# Patient Record
Sex: FEMALE | Race: WHITE | NOT HISPANIC OR LATINO | Employment: OTHER | URBAN - METROPOLITAN AREA
[De-identification: names, ages, dates, MRNs, and addresses within clinical notes are randomized per-mention and may not be internally consistent; named-entity substitution may affect disease eponyms.]

---

## 2018-12-27 ENCOUNTER — RECORDS - HEALTHEAST (OUTPATIENT)
Dept: LAB | Facility: CLINIC | Age: 68
End: 2018-12-27

## 2018-12-27 LAB
ALBUMIN SERPL-MCNC: 4.1 G/DL (ref 3.5–5)
ALP SERPL-CCNC: 67 U/L (ref 45–120)
ALT SERPL W P-5'-P-CCNC: 12 U/L (ref 0–45)
ANION GAP SERPL CALCULATED.3IONS-SCNC: 11 MMOL/L (ref 5–18)
AST SERPL W P-5'-P-CCNC: 16 U/L (ref 0–40)
BILIRUB SERPL-MCNC: 0.6 MG/DL (ref 0–1)
BUN SERPL-MCNC: 10 MG/DL (ref 8–28)
CALCIUM SERPL-MCNC: 10 MG/DL (ref 8.5–10.5)
CHLORIDE BLD-SCNC: 98 MMOL/L (ref 98–107)
CHOLEST SERPL-MCNC: 221 MG/DL
CO2 SERPL-SCNC: 25 MMOL/L (ref 22–31)
CREAT SERPL-MCNC: 0.7 MG/DL (ref 0.6–1.1)
FASTING STATUS PATIENT QL REPORTED: NO
GFR SERPL CREATININE-BSD FRML MDRD: >60 ML/MIN/1.73M2
GLUCOSE BLD-MCNC: 112 MG/DL (ref 70–125)
HDLC SERPL-MCNC: 86 MG/DL
LDLC SERPL CALC-MCNC: 121 MG/DL
POTASSIUM BLD-SCNC: 4.1 MMOL/L (ref 3.5–5)
PROT SERPL-MCNC: 6.6 G/DL (ref 6–8)
SODIUM SERPL-SCNC: 134 MMOL/L (ref 136–145)
TRIGL SERPL-MCNC: 69 MG/DL

## 2020-03-17 ENCOUNTER — RECORDS - HEALTHEAST (OUTPATIENT)
Dept: LAB | Facility: CLINIC | Age: 70
End: 2020-03-17

## 2020-03-19 LAB — BACTERIA SPEC CULT: ABNORMAL

## 2021-03-12 ENCOUNTER — RECORDS - HEALTHEAST (OUTPATIENT)
Dept: LAB | Facility: CLINIC | Age: 71
End: 2021-03-12

## 2021-03-12 LAB
ALBUMIN SERPL-MCNC: 4 G/DL (ref 3.5–5)
ALP SERPL-CCNC: 63 U/L (ref 45–120)
ALT SERPL W P-5'-P-CCNC: 10 U/L (ref 0–45)
ANION GAP SERPL CALCULATED.3IONS-SCNC: 12 MMOL/L (ref 5–18)
AST SERPL W P-5'-P-CCNC: 15 U/L (ref 0–40)
BILIRUB SERPL-MCNC: 0.9 MG/DL (ref 0–1)
BUN SERPL-MCNC: 15 MG/DL (ref 8–28)
CALCIUM SERPL-MCNC: 9.2 MG/DL (ref 8.5–10.5)
CHLORIDE BLD-SCNC: 96 MMOL/L (ref 98–107)
CO2 SERPL-SCNC: 25 MMOL/L (ref 22–31)
CREAT SERPL-MCNC: 0.74 MG/DL (ref 0.6–1.1)
GFR SERPL CREATININE-BSD FRML MDRD: >60 ML/MIN/1.73M2
GLUCOSE BLD-MCNC: 99 MG/DL (ref 70–125)
POTASSIUM BLD-SCNC: 4.1 MMOL/L (ref 3.5–5)
PROT SERPL-MCNC: 6.4 G/DL (ref 6–8)
SODIUM SERPL-SCNC: 133 MMOL/L (ref 136–145)

## 2021-03-14 LAB — BACTERIA SPEC CULT: ABNORMAL

## 2021-06-01 ENCOUNTER — RECORDS - HEALTHEAST (OUTPATIENT)
Dept: ADMINISTRATIVE | Facility: CLINIC | Age: 71
End: 2021-06-01

## 2021-06-09 ENCOUNTER — RECORDS - HEALTHEAST (OUTPATIENT)
Dept: ADMINISTRATIVE | Facility: CLINIC | Age: 71
End: 2021-06-09

## 2021-07-13 ENCOUNTER — RECORDS - HEALTHEAST (OUTPATIENT)
Dept: ADMINISTRATIVE | Facility: CLINIC | Age: 71
End: 2021-07-13

## 2021-07-16 ENCOUNTER — LAB REQUISITION (OUTPATIENT)
Dept: LAB | Facility: CLINIC | Age: 71
End: 2021-07-16
Payer: COMMERCIAL

## 2021-07-16 DIAGNOSIS — R60.0 LOCALIZED EDEMA: ICD-10-CM

## 2021-07-16 LAB
ALBUMIN SERPL-MCNC: 4.2 G/DL (ref 3.5–5)
ALP SERPL-CCNC: 83 U/L (ref 45–120)
ALT SERPL W P-5'-P-CCNC: 9 U/L (ref 0–45)
ANION GAP SERPL CALCULATED.3IONS-SCNC: 14 MMOL/L (ref 5–18)
AST SERPL W P-5'-P-CCNC: 16 U/L (ref 0–40)
BILIRUB SERPL-MCNC: 0.9 MG/DL (ref 0–1)
BUN SERPL-MCNC: 14 MG/DL (ref 8–28)
CALCIUM SERPL-MCNC: 10 MG/DL (ref 8.5–10.5)
CHLORIDE BLD-SCNC: 96 MMOL/L (ref 98–107)
CO2 SERPL-SCNC: 21 MMOL/L (ref 22–31)
CREAT SERPL-MCNC: 0.73 MG/DL (ref 0.6–1.1)
GFR SERPL CREATININE-BSD FRML MDRD: 82 ML/MIN/1.73M2
GLUCOSE BLD-MCNC: 112 MG/DL (ref 70–125)
POTASSIUM BLD-SCNC: 4.5 MMOL/L (ref 3.5–5)
PROT SERPL-MCNC: 6.7 G/DL (ref 6–8)
SODIUM SERPL-SCNC: 131 MMOL/L (ref 136–145)

## 2021-07-16 PROCEDURE — 82040 ASSAY OF SERUM ALBUMIN: CPT | Performed by: FAMILY MEDICINE

## 2021-07-16 PROCEDURE — 82247 BILIRUBIN TOTAL: CPT | Performed by: FAMILY MEDICINE

## 2021-07-21 ENCOUNTER — RECORDS - HEALTHEAST (OUTPATIENT)
Dept: ADMINISTRATIVE | Facility: CLINIC | Age: 71
End: 2021-07-21

## 2022-07-20 ENCOUNTER — LAB REQUISITION (OUTPATIENT)
Dept: LAB | Facility: CLINIC | Age: 72
End: 2022-07-20
Payer: COMMERCIAL

## 2022-07-20 DIAGNOSIS — R41.3 OTHER AMNESIA: ICD-10-CM

## 2022-07-20 DIAGNOSIS — I10 ESSENTIAL (PRIMARY) HYPERTENSION: ICD-10-CM

## 2022-07-20 DIAGNOSIS — Z13.220 ENCOUNTER FOR SCREENING FOR LIPOID DISORDERS: ICD-10-CM

## 2022-07-20 LAB
ALBUMIN SERPL BCG-MCNC: 4.6 G/DL (ref 3.5–5.2)
ALP SERPL-CCNC: 52 U/L (ref 35–104)
ALT SERPL W P-5'-P-CCNC: 8 U/L (ref 10–35)
ANION GAP SERPL CALCULATED.3IONS-SCNC: 12 MMOL/L (ref 7–15)
AST SERPL W P-5'-P-CCNC: 17 U/L (ref 10–35)
BILIRUB SERPL-MCNC: 0.7 MG/DL
BUN SERPL-MCNC: 16.6 MG/DL (ref 8–23)
CALCIUM SERPL-MCNC: 10.1 MG/DL (ref 8.8–10.2)
CHLORIDE SERPL-SCNC: 100 MMOL/L (ref 98–107)
CHOLEST SERPL-MCNC: 171 MG/DL
CREAT SERPL-MCNC: 0.82 MG/DL (ref 0.51–0.95)
CRP SERPL-MCNC: <3 MG/L
DEPRECATED HCO3 PLAS-SCNC: 26 MMOL/L (ref 22–29)
ERYTHROCYTE [SEDIMENTATION RATE] IN BLOOD BY WESTERGREN METHOD: 9 MM/HR (ref 0–30)
GFR SERPL CREATININE-BSD FRML MDRD: 75 ML/MIN/1.73M2
GLUCOSE SERPL-MCNC: 117 MG/DL (ref 70–99)
HDLC SERPL-MCNC: 75 MG/DL
LDLC SERPL CALC-MCNC: 85 MG/DL
NONHDLC SERPL-MCNC: 96 MG/DL
POTASSIUM SERPL-SCNC: 3.8 MMOL/L (ref 3.4–5.3)
PROT SERPL-MCNC: 6.8 G/DL (ref 6.4–8.3)
SODIUM SERPL-SCNC: 138 MMOL/L (ref 136–145)
TRIGL SERPL-MCNC: 56 MG/DL
TSH SERPL DL<=0.005 MIU/L-ACNC: 3.69 UIU/ML (ref 0.3–4.2)
VIT B12 SERPL-MCNC: 313 PG/ML (ref 232–1245)

## 2022-07-20 PROCEDURE — 86140 C-REACTIVE PROTEIN: CPT | Mod: ORL | Performed by: PHYSICIAN ASSISTANT

## 2022-07-20 PROCEDURE — 80061 LIPID PANEL: CPT | Mod: ORL | Performed by: PHYSICIAN ASSISTANT

## 2022-07-20 PROCEDURE — 82607 VITAMIN B-12: CPT | Mod: ORL | Performed by: PHYSICIAN ASSISTANT

## 2022-07-20 PROCEDURE — 84443 ASSAY THYROID STIM HORMONE: CPT | Mod: ORL | Performed by: PHYSICIAN ASSISTANT

## 2022-07-20 PROCEDURE — 82746 ASSAY OF FOLIC ACID SERUM: CPT | Mod: ORL | Performed by: PHYSICIAN ASSISTANT

## 2022-07-20 PROCEDURE — 85652 RBC SED RATE AUTOMATED: CPT | Mod: ORL | Performed by: PHYSICIAN ASSISTANT

## 2022-07-20 PROCEDURE — 80053 COMPREHEN METABOLIC PANEL: CPT | Mod: ORL | Performed by: PHYSICIAN ASSISTANT

## 2022-07-21 LAB — FOLATE SERPL-MCNC: 5.3 NG/ML (ref 4.6–34.8)

## 2022-08-12 ENCOUNTER — HOSPITAL ENCOUNTER (OUTPATIENT)
Dept: MAMMOGRAPHY | Facility: CLINIC | Age: 72
Discharge: HOME | End: 2022-08-12
Attending: PHYSICIAN ASSISTANT | Admitting: PHYSICIAN ASSISTANT
Payer: COMMERCIAL

## 2022-08-12 DIAGNOSIS — Z12.31 VISIT FOR SCREENING MAMMOGRAM: ICD-10-CM

## 2022-08-12 PROCEDURE — 77067 SCR MAMMO BI INCL CAD: CPT

## 2022-09-19 ENCOUNTER — HOSPITAL ENCOUNTER (OUTPATIENT)
Dept: MAMMOGRAPHY | Facility: CLINIC | Age: 72
Discharge: HOME | End: 2022-09-19
Attending: PHYSICIAN ASSISTANT
Payer: COMMERCIAL

## 2022-09-19 DIAGNOSIS — N64.89 BREAST ASYMMETRY: ICD-10-CM

## 2022-09-19 PROCEDURE — 77061 BREAST TOMOSYNTHESIS UNI: CPT | Mod: RT

## 2022-09-19 PROCEDURE — 76642 ULTRASOUND BREAST LIMITED: CPT | Mod: RT

## 2023-05-23 ENCOUNTER — APPOINTMENT (OUTPATIENT)
Dept: RADIOLOGY | Facility: CLINIC | Age: 73
End: 2023-05-23
Attending: STUDENT IN AN ORGANIZED HEALTH CARE EDUCATION/TRAINING PROGRAM
Payer: COMMERCIAL

## 2023-05-23 ENCOUNTER — HOSPITAL ENCOUNTER (EMERGENCY)
Facility: CLINIC | Age: 73
Discharge: HOME | End: 2023-05-23
Attending: STUDENT IN AN ORGANIZED HEALTH CARE EDUCATION/TRAINING PROGRAM | Admitting: STUDENT IN AN ORGANIZED HEALTH CARE EDUCATION/TRAINING PROGRAM
Payer: COMMERCIAL

## 2023-05-23 ENCOUNTER — APPOINTMENT (OUTPATIENT)
Dept: CT IMAGING | Facility: CLINIC | Age: 73
End: 2023-05-23
Attending: EMERGENCY MEDICINE
Payer: COMMERCIAL

## 2023-05-23 VITALS
DIASTOLIC BLOOD PRESSURE: 64 MMHG | TEMPERATURE: 98.3 F | HEART RATE: 66 BPM | WEIGHT: 145 LBS | SYSTOLIC BLOOD PRESSURE: 134 MMHG | HEIGHT: 70 IN | RESPIRATION RATE: 20 BRPM | BODY MASS INDEX: 20.76 KG/M2 | OXYGEN SATURATION: 99 %

## 2023-05-23 DIAGNOSIS — S01.01XA SCALP LACERATION, INITIAL ENCOUNTER: ICD-10-CM

## 2023-05-23 DIAGNOSIS — S09.90XA INJURY OF HEAD, INITIAL ENCOUNTER: ICD-10-CM

## 2023-05-23 PROCEDURE — 73090 X-RAY EXAM OF FOREARM: CPT | Mod: LT

## 2023-05-23 PROCEDURE — 12002 RPR S/N/AX/GEN/TRNK2.6-7.5CM: CPT

## 2023-05-23 PROCEDURE — 250N000013 HC RX MED GY IP 250 OP 250 PS 637: Performed by: STUDENT IN AN ORGANIZED HEALTH CARE EDUCATION/TRAINING PROGRAM

## 2023-05-23 PROCEDURE — 90471 IMMUNIZATION ADMIN: CPT | Performed by: EMERGENCY MEDICINE

## 2023-05-23 PROCEDURE — 70450 CT HEAD/BRAIN W/O DYE: CPT

## 2023-05-23 PROCEDURE — 90715 TDAP VACCINE 7 YRS/> IM: CPT | Performed by: EMERGENCY MEDICINE

## 2023-05-23 PROCEDURE — 250N000011 HC RX IP 250 OP 636: Performed by: EMERGENCY MEDICINE

## 2023-05-23 PROCEDURE — 72125 CT NECK SPINE W/O DYE: CPT

## 2023-05-23 PROCEDURE — 99284 EMERGENCY DEPT VISIT MOD MDM: CPT | Mod: 25

## 2023-05-23 RX ORDER — GINSENG 100 MG
CAPSULE ORAL ONCE
Status: DISCONTINUED | OUTPATIENT
Start: 2023-05-23 | End: 2023-05-23 | Stop reason: HOSPADM

## 2023-05-23 RX ORDER — ACETAMINOPHEN 325 MG/1
650 TABLET ORAL ONCE
Status: COMPLETED | OUTPATIENT
Start: 2023-05-23 | End: 2023-05-23

## 2023-05-23 RX ADMIN — CLOSTRIDIUM TETANI TOXOID ANTIGEN (FORMALDEHYDE INACTIVATED), CORYNEBACTERIUM DIPHTHERIAE TOXOID ANTIGEN (FORMALDEHYDE INACTIVATED), BORDETELLA PERTUSSIS TOXOID ANTIGEN (GLUTARALDEHYDE INACTIVATED), BORDETELLA PERTUSSIS FILAMENTOUS HEMAGGLUTININ ANTIGEN (FORMALDEHYDE INACTIVATED), BORDETELLA PERTUSSIS PERTACTIN ANTIGEN, AND BORDETELLA PERTUSSIS FIMBRIAE 2/3 ANTIGEN 0.5 ML: 5; 2; 2.5; 5; 3; 5 INJECTION, SUSPENSION INTRAMUSCULAR at 12:11

## 2023-05-23 RX ADMIN — ACETAMINOPHEN 650 MG: 325 TABLET ORAL at 12:06

## 2023-05-23 NOTE — ED PROVIDER NOTES
NAME: Stephanie Acuna  AGE: 73 year old female  YOB: 1950  MRN: [id number]  EVALUATION DATE & TIME: No admission date for patient encounter.    PCP: Clara Franz  ED PROVIDER: Camille Crowley MD.    Chief Complaint   Patient presents with     Fall       FINAL IMPRESSION:  1. Scalp laceration, initial encounter    2. Injury of head, initial encounter        MEDICAL DECISION MAKING:      MDM: 73-year-old female presents with fall.  Patient had a mechanical fall down 3 steps in the garage and hit the back of her head.  Given her age CT of the head and C-spine were obtained which did not show any acute findings.  History and exam not suggestive of significant chest, abdominal or spinal trauma.  Has some pain and a skin tear to her left forearm.  No other obvious extremity deformities or pain.  Her tetanus was updated.  Laceration was repaired.  She is otherwise at her baseline with no other symptoms preceding the fall.  Given this did not feel needed labs, EKG or other workup for cause of fall.  Daughter feels comfortable with plan for discharge home.  Recommended close outpatient follow-up.  Strict return precautions discussed and they are in agreement with the plan and their questions were answered.    Medical Decision Making    History:    Supplemental history from: Documented in chart, if applicable and Family Member/Significant Other    External Record(s) reviewed: Documented in chart, if applicable.    Work Up:    Chart documentation includes differential considered and any EKGs or imaging interpreted by provider.    In additional to work up documented, I considered the following work up: Documented in chart, if applicable.    External consultation:    Discussion of management with another provider: Documented in chart, if applicable    Complicating factors:    Care impacted by chronic illness: Chronic Lung Disease    Care affected by social determinants of health: N/A    Disposition  considerations: Discharge. No recommendations on prescription strength medication(s). I considered admission, but ultimately discharged patient given reassuring imaging and daughter comfortable with discharge home.      MEDICATIONS GIVEN IN THE EMERGENCY:  Medications   bacitracin ointment (has no administration in time range)   Tdap (tetanus-diphtheria-acell pertussis) (ADACEL) injection 0.5 mL (0.5 mLs Intramuscular $Given 5/23/23 1211)   acetaminophen (TYLENOL) tablet 650 mg (650 mg Oral $Given 5/23/23 1206)       NEW PRESCRIPTIONS STARTED AT TODAY'S ER VISIT:  New Prescriptions    No medications on file        =================================================================  HPI    Patient information was obtained from: patient and daughter  Use of : N/A      Stephanie Acuna is a 73 year old female with a past medical history of asthma/bronchiectasis, alzheimer's (per daughter) who presents with fall.  Per patient's daughter she was going up the stairs in the garage when she fell down about 3 stairs and landed on her left side.  She did hit her head and sustained a cut to the back of her head.  No LOC, no vomiting.  Is at her baseline mental status.  Has not tried to ambulate yet.  She also reports mild pain to her left forearm where she has a skin tear.  No neck, back, chest, abdominal pain.    PAST MEDICAL HISTORY:  No past medical history on file.    PAST SURGICAL HISTORY:  Past Surgical History:   Procedure Laterality Date     HYSTERECTOMY  Early 1990's     OOPHORECTOMY Bilateral Early 1990's       CURRENT MEDICATIONS:      Current Facility-Administered Medications:      bacitracin ointment, , Topical, Once, Raman Cantu MD  No current outpatient medications on file.    ALLERGIES:  Not on File    FAMILY HISTORY:  Family History   Problem Relation Age of Onset     Ovarian Cancer Maternal Grandmother        SOCIAL HISTORY:   Social History     Socioeconomic History     Marital status:  "Single       PHYSICAL EXAM:    Vitals: BP (!) 144/89   Pulse 91   Temp 98.3  F (36.8  C)   Resp 20   Ht 1.778 m (5' 10\")   Wt 65.8 kg (145 lb)   SpO2 100%   BMI 20.81 kg/m     Constitutional: Well developed, well nourished. No acute distress  HENT: Normocephalic, boggy bump to posterior scalp with three linear laceartions. Neck-gross ROM intact, no C spine tenderness  Eyes: Pupils mid-range and equal, sclera white  Respiratory: CTAB, no respiratory distress, speaks full sentences easily.  Cardiovascular: Normal heart rate, regular rhythm  GI: Soft, not distended, not tender to palpation, no guarding  Musculoskeletal: Moving all 4 extremities intentionally and without pain. Skin tear to the left forearm.  Skin: Warm, dry  Back: no midline C, T, L spine tenderness or deformities   Neurologic: Alert, confused (dementia), answering most questions, CNs grossly intact, speech clear, moving all extremities    LAB:  All pertinent labs reviewed and interpreted.  Labs Ordered and Resulted from Time of ED Arrival to Time of ED Departure - No data to display    RADIOLOGY:  Radius/Ulna XR,  PA &LAT, left   Final Result   IMPRESSION: Radius and ulna negative. No acute fracture. Mild soft tissue swelling and likely tiny gas bubble in the superficial soft tissues of the dorsal forearm. 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      CT Cervical Spine w/o Contrast   Final Result   IMPRESSION:   1.  No fracture or traumatic subluxation of the cervical spine.   2.  Multilevel cervical spondylosis with degenerative spondylolisthesis.      CT Head w/o Contrast   Final Result   IMPRESSION:   1.  No acute intracranial process.   2.  Small to moderate left parietal scalp hematoma. No calvarial fracture.   3.  Mild chronic small vessel ischemic disease and generalized brain parenchymal volume loss.                             EKG:   N/A    PROCEDURES:   Procedures   PROCEDURE: Laceration Repair   INDICATIONS: " Laceration   PROCEDURE PROVIDER: Dr Camille Crowley   SITE: Scalp   TYPE/SIZE: simple, clean and no foreign body visualized  3 cm (total length)   FUNCTIONAL ASSESSMENT: Distal sensation, circulation and motor intact   MEDICATION: 4 mLs of 1% Lidocaine with epinephrine   PREPARATION: soaking and irrigation with Normal saline   DEBRIDEMENT: no debridement and wound explored, no foreign body found   CLOSURE:  Superficial layer closed with 4 stitches of 4-0 Prolene simple interrupted    Total number of sutures/staples placed: 4     Camille Crowley M.D.  Emergency Medicine  [facility] EMERGENCY ROOM  [address] [place] [id number] [phone]  Dept: [phone]       Camille Crowley MD  05/23/23 1300

## 2023-05-23 NOTE — DISCHARGE INSTRUCTIONS
Follow up in 1 week in clinic for reassessment and suture removal  Return to the Emergency Room with change in mental status, vomiting, numbness/weakness or any other worsening symptoms or concerns

## 2023-05-23 NOTE — ED TRIAGE NOTES
Pt was brought in by [facility]. Patient was walking out to garage and slipped down the last 2-3 stairs. Patients posterior head then hit the ground. Patient has laceration with bleeding controlled. Hx of HTN and alzheimer. Recently moved in with daughter.  Bleeding controlled in triage. Wound hard to visualize due to dried blood and hair.

## 2023-05-23 NOTE — ED TRIAGE NOTES
Triage Assessment     Row Name 05/23/23 1016       Triage Assessment (Adult)    Airway WDL WDL       Respiratory WDL    Respiratory WDL WDL       Cardiac WDL    Cardiac WDL WDL       Peripheral/Neurovascular WDL    Peripheral Neurovascular WDL WDL       Cognitive/Neuro/Behavioral WDL    Cognitive/Neuro/Behavioral WDL WDL

## 2023-06-01 ENCOUNTER — DOCUMENTATION ONLY (OUTPATIENT)
Dept: OTHER | Facility: CLINIC | Age: 73
End: 2023-06-01
Payer: COMMERCIAL

## 2023-11-15 ENCOUNTER — LAB REQUISITION (OUTPATIENT)
Dept: LAB | Facility: CLINIC | Age: 73
End: 2023-11-15
Payer: COMMERCIAL

## 2023-11-15 DIAGNOSIS — I10 ESSENTIAL (PRIMARY) HYPERTENSION: ICD-10-CM

## 2023-11-15 LAB
ANION GAP SERPL CALCULATED.3IONS-SCNC: 14 MMOL/L (ref 7–15)
BUN SERPL-MCNC: 20.8 MG/DL (ref 8–23)
CALCIUM SERPL-MCNC: 9.7 MG/DL (ref 8.8–10.2)
CHLORIDE SERPL-SCNC: 101 MMOL/L (ref 98–107)
CHOLEST SERPL-MCNC: 178 MG/DL
CREAT SERPL-MCNC: 0.89 MG/DL (ref 0.51–0.95)
DEPRECATED HCO3 PLAS-SCNC: 23 MMOL/L (ref 22–29)
EGFRCR SERPLBLD CKD-EPI 2021: 67 ML/MIN/1.73M2
GLUCOSE SERPL-MCNC: 122 MG/DL (ref 70–99)
HDLC SERPL-MCNC: 70 MG/DL
LDLC SERPL CALC-MCNC: 89 MG/DL
NONHDLC SERPL-MCNC: 108 MG/DL
POTASSIUM SERPL-SCNC: 3.5 MMOL/L (ref 3.4–5.3)
SODIUM SERPL-SCNC: 138 MMOL/L (ref 135–145)
TRIGL SERPL-MCNC: 94 MG/DL

## 2023-11-15 PROCEDURE — 80048 BASIC METABOLIC PNL TOTAL CA: CPT | Mod: ORL | Performed by: PHYSICIAN ASSISTANT

## 2023-11-15 PROCEDURE — 80061 LIPID PANEL: CPT | Mod: ORL | Performed by: PHYSICIAN ASSISTANT

## 2024-01-19 ENCOUNTER — HOSPITAL ENCOUNTER (EMERGENCY)
Facility: CLINIC | Age: 74
Discharge: HOME | End: 2024-01-19
Admitting: PHYSICIAN ASSISTANT
Payer: COMMERCIAL

## 2024-01-19 ENCOUNTER — APPOINTMENT (OUTPATIENT)
Dept: ULTRASOUND IMAGING | Facility: CLINIC | Age: 74
End: 2024-01-19
Attending: PHYSICIAN ASSISTANT
Payer: COMMERCIAL

## 2024-01-19 VITALS
HEART RATE: 95 BPM | SYSTOLIC BLOOD PRESSURE: 140 MMHG | BODY MASS INDEX: 23.68 KG/M2 | OXYGEN SATURATION: 97 % | DIASTOLIC BLOOD PRESSURE: 85 MMHG | TEMPERATURE: 97.7 F | RESPIRATION RATE: 19 BRPM | WEIGHT: 165 LBS

## 2024-01-19 DIAGNOSIS — I82.4Y2 ACUTE DEEP VEIN THROMBOSIS (DVT) OF PROXIMAL VEIN OF LEFT LOWER EXTREMITY (H): ICD-10-CM

## 2024-01-19 LAB
ANION GAP SERPL CALCULATED.3IONS-SCNC: 12 MMOL/L (ref 7–15)
BUN SERPL-MCNC: 18.7 MG/DL (ref 8–23)
CALCIUM SERPL-MCNC: 10.3 MG/DL (ref 8.8–10.2)
CHLORIDE SERPL-SCNC: 97 MMOL/L (ref 98–107)
CREAT SERPL-MCNC: 0.88 MG/DL (ref 0.51–0.95)
DEPRECATED HCO3 PLAS-SCNC: 26 MMOL/L (ref 22–29)
EGFRCR SERPLBLD CKD-EPI 2021: 68 ML/MIN/1.73M2
ERYTHROCYTE [DISTWIDTH] IN BLOOD BY AUTOMATED COUNT: 12.3 % (ref 10–15)
GLUCOSE SERPL-MCNC: 139 MG/DL (ref 70–99)
HCT VFR BLD AUTO: 38.7 % (ref 35–47)
HGB BLD-MCNC: 13.3 G/DL (ref 11.7–15.7)
MCH RBC QN AUTO: 31.7 PG (ref 26.5–33)
MCHC RBC AUTO-ENTMCNC: 34.4 G/DL (ref 31.5–36.5)
MCV RBC AUTO: 92 FL (ref 78–100)
PLATELET # BLD AUTO: 309 10E3/UL (ref 150–450)
POTASSIUM SERPL-SCNC: 3.7 MMOL/L (ref 3.4–5.3)
RADIOLOGIST FLAGS: ABNORMAL
RBC # BLD AUTO: 4.2 10E6/UL (ref 3.8–5.2)
SODIUM SERPL-SCNC: 135 MMOL/L (ref 135–145)
WBC # BLD AUTO: 7.7 10E3/UL (ref 4–11)

## 2024-01-19 PROCEDURE — 36415 COLL VENOUS BLD VENIPUNCTURE: CPT | Performed by: PHYSICIAN ASSISTANT

## 2024-01-19 PROCEDURE — 93970 EXTREMITY STUDY: CPT

## 2024-01-19 PROCEDURE — 82374 ASSAY BLOOD CARBON DIOXIDE: CPT | Performed by: PHYSICIAN ASSISTANT

## 2024-01-19 PROCEDURE — 85027 COMPLETE CBC AUTOMATED: CPT | Performed by: PHYSICIAN ASSISTANT

## 2024-01-19 PROCEDURE — 99284 EMERGENCY DEPT VISIT MOD MDM: CPT | Mod: 25

## 2024-01-19 RX ORDER — APIXABAN 5 MG (74)
KIT ORAL
Qty: 74 EACH | Refills: 0 | Status: SHIPPED | OUTPATIENT
Start: 2024-01-19 | End: 2024-02-18

## 2024-01-19 ASSESSMENT — ENCOUNTER SYMPTOMS: SHORTNESS OF BREATH: 0

## 2024-01-20 NOTE — DISCHARGE INSTRUCTIONS
Ultrasound did show DVT in the left lower leg.   Start the anticoagulation (blood thinner medicine) as we discussed:  Eliquis 10 mg (2 tablets) twice daily for 7 days, THEN  Eliquis 5 mg (1 tablet) twice daily.    I have written a prescription for the starter pack (first month), but you will need additional prescriptions from your primary provider. Call on Monday to schedule follow up appointment in the next 1-2 weeks.    You have been started on a new blood thinning medicine, ELIQUIS. Do not discontinue this drug unless instructed to by a physician. If you miss a dose ofthis medication please take it as soon as you remember.   You are at higher risk of bleeding while taking this medication. If you experience any traumatic accident, head injury, have bleeding that will not stop such as anosebleed, or develop black or bloody stools please return to an Emergency Department for evaluation.   For patient s taking Xarelto, this medication must be taken with a meal.     Be very careful to prevent falls. Make sure you are using your walker.     Return to the emergency department if you develop fevers, worsening pain, shortness of breath/difficulty breathing, or any falls - especially with head injuries.

## 2024-01-20 NOTE — ED TRIAGE NOTES
Pt here after she was found to have a DVT in one of her legs after a routine ultrasound. Left side. Was trying to get a blood thinner via entira but the refused to fill it and sent them here. No symptoms other than swelling.      Triage Assessment (Adult)       Row Name 01/19/24 1658          Triage Assessment    Airway WDL WDL        Respiratory WDL    Respiratory WDL WDL        Skin Circulation/Temperature WDL    Skin Circulation/Temperature WDL WDL        Cardiac WDL    Cardiac WDL WDL        Peripheral/Neurovascular WDL    Peripheral Neurovascular WDL WDL        Cognitive/Neuro/Behavioral WDL    Cognitive/Neuro/Behavioral WDL WDL

## 2024-01-20 NOTE — ED PROVIDER NOTES
EMERGENCY DEPARTMENT ENCOUNTER      NAME: Stephanie Acuna  AGE: 73 year old female  YOB: 1950  MRN: [id number]  EVALUATION DATE & TIME: No admission date for patient encounter.    PCP: Clara Franz    ED PROVIDER: Falguni Mesa PA-C      Chief Complaint   Patient presents with    Abnormal Cxr/ct         FINAL IMPRESSION:  1. Acute deep vein thrombosis (DVT) of proximal vein of left lower extremity (H)          ED COURSE & MEDICAL DECISION MAKIN:20 PM I introduced myself to patient, performed initial HPI and examination.   6:42 PM [facility] medical records staff is home for the day. They are unable to send her ultrasound results from earlier today until tomorrow morning. We will plan to repeat her ultrasound here in the ED.   11:30 PM I rechecked on the patient and updated them on lab and US results. We discussed the plan for discharge and the patient is agreeable. Reviewed supportive cares, symptomatic treatment, outpatient follow up, and reasons to return to the Emergency Department. Patient to be discharged by ED RN.        73 year old female with PMH asthma presents to the Emergency Department for evaluation of abnormal ultrasound. Patient has routine ultrasound for venous insufficiency, had it performed through [facility] earlier today. Reportedly findings for DVT in the left leg, sent to patient's PCP (Michael), but PCP was unable to prescribe anticoagulation prompting ED evaluation.  Unfortunately, patient does not have imaging results and we are unable to have this faxed from the imaging center.    No history of DVT. Patient denies shortness of breath, chest pain or pleuritic pain.  Patient does have a history of falls, uses walker. Lives with daughter.     Exam is ultimately unremarkable.   Labs obtained primarily for evaluation of hemoglobin and creatinine with anticipation of starting anticoagulation, WNL.   Ultrasound repeated, shows DVT from left tibioperoneal trunk into proximal aspect  of left posterior tibial vein.    Plan for discharge with oral anticoagulation. Discussed administration, at home management and precautions. Instructed on close follow up with PCP and red flags/indications to return to the emergency department. All questions were answered to the best of my ability and patient is agreeable with plan.          Medical Decision Making    History:  Supplemental history from: Documented in chart and Family Member/Significant Other  External Record(s) reviewed: Documented in chart and Outpatient Record: 7/24/2018 [facility]    Work Up:  Chart documentation includes differential considered and any EKGs or imaging independently interpreted by provider.  In additional to work up documented, I considered the following work up: Documented in chart, if applicable.    External consultation:  Discussion of management with another provider: Documented in chart, if applicable    Complicating factors:  Care impacted by chronic illness: Chronic Lung Disease and Hypertension  Care affected by social determinants of health: N/A    Disposition considerations: Discharge. I prescribed additional prescription strength medication(s) as charted. See documentation for any additional details.      MEDICATIONS GIVEN IN THE EMERGENCY:  Medications - No data to display    NEW PRESCRIPTIONS STARTED AT TODAY'S ER VISIT  Discharge Medication List as of 1/19/2024 11:31 PM        START taking these medications    Details   Apixaban Starter Pack (ELIQUIS DVT/PE STARTER PACK) 5 MG TBPK Take 10 mg by mouth 2 times daily for 7 days, THEN 5 mg 2 times daily for 23 days., Disp-74 each, R-0, Local Print                =================================================================    HPI    Patient information was obtained from: patient and daughter    Use of : N/A         Stephanie Acuna is a 73 year old female with a pertinent history of hypertension, bronchiectasis, who presents to  this ED via private car for evaluation of leg swelling. The patient had left leg ultrasound today at [facility] for leg swelling and venous insufficiency which reportedly demonstrated a DVT. She was unable to be prescribed a blood thinner there or by her PCP, so she was sent to the ED. She denies any prior history of blood clots. She also denies any recent shortness of breath or chest pain. She does not have any pain with deep inspiration. She is somewhat unsteady with walking. She fell last week hitting her forehead as she was getting out of a chair. No other recent falls.       REVIEW OF SYSTEMS   Review of Systems   Respiratory:  Negative for shortness of breath.    Cardiovascular:  Positive for leg swelling (left). Negative for chest pain.        PAST MEDICAL HISTORY:  History reviewed. No pertinent past medical history.    PAST SURGICAL HISTORY:  Past Surgical History:   Procedure Laterality Date    HYSTERECTOMY  Early 1990's    OOPHORECTOMY Bilateral Early 1990's       CURRENT MEDICATIONS:    Apixaban Starter Pack (ELIQUIS DVT/PE STARTER PACK) 5 MG TBPK        ALLERGIES:  Allergies   Allergen Reactions    Sulfa Antibiotics Unknown       FAMILY HISTORY:  Family History   Problem Relation Age of Onset    Ovarian Cancer Maternal Grandmother        SOCIAL HISTORY:   Social History     Socioeconomic History    Marital status: Single       VITALS:  BP (!) 140/85   Pulse 95   Temp 97.7  F (36.5  C)   Resp 19   Wt 74.8 kg (165 lb)   SpO2 97%   BMI 23.68 kg/m      PHYSICAL EXAM    Constitutional: Well developed, Well nourished, NAD, GCS 15   HENT: Normocephalic, Atraumatic.   Neck- Supple, Nontender. Normal ROM  Eyes: Conjunctiva normal.  Respiratory: No respiratory distress, speaking in full sentences. Normal breath sounds, No wheezing  Cardiovascular: Normal heart rate, Regular rhythm, No murmurs.   Musculoskeletal: No deformities, Moves all extremities equally. No lower extremity swelling or tenderness.    Integument: Warm, Dry, No erythema, ecchymosis, or rash.  Neurologic: Alert & oriented x 3, Normal sensory function. No focal deficits.   Psychiatric: Affect normal, Judgment normal, Mood normal. Cooperative.      LAB:  All pertinent labs reviewed and interpreted.  Results for orders placed or performed during the hospital encounter of 01/19/24   US Lower Extremity Venous Duplex Bilateral   Result Value Ref Range    Radiologist flags New diagnosis of DVT (AA)     Impression    IMPRESSION:  1.  Deep vein thrombosis extending from the left tibioperoneal trunk into the proximal aspect of the left posterior tibial vein.  2.  No deep venous thrombosis in the right lower extremity.      [Critical Result: New diagnosis of DVT]    Finding was identified on 1/19/2024 11:22 PM CST.     Falguni LEWIS was contacted by me on 1/19/2024 11:27 PM CST and verbalized understanding of the critical result.    CBC (+ platelets, no diff)   Result Value Ref Range    WBC Count 7.7 4.0 - 11.0 10e3/uL    RBC Count 4.20 3.80 - 5.20 10e6/uL    Hemoglobin 13.3 11.7 - 15.7 g/dL    Hematocrit 38.7 35.0 - 47.0 %    MCV 92 78 - 100 fL    MCH 31.7 26.5 - 33.0 pg    MCHC 34.4 31.5 - 36.5 g/dL    RDW 12.3 10.0 - 15.0 %    Platelet Count 309 150 - 450 10e3/uL   Basic metabolic panel   Result Value Ref Range    Sodium 135 135 - 145 mmol/L    Potassium 3.7 3.4 - 5.3 mmol/L    Chloride 97 (L) 98 - 107 mmol/L    Carbon Dioxide (CO2) 26 22 - 29 mmol/L    Anion Gap 12 7 - 15 mmol/L    Urea Nitrogen 18.7 8.0 - 23.0 mg/dL    Creatinine 0.88 0.51 - 0.95 mg/dL    GFR Estimate 68 >60 mL/min/1.73m2    Calcium 10.3 (H) 8.8 - 10.2 mg/dL    Glucose 139 (H) 70 - 99 mg/dL       RADIOLOGY:  Reviewed all pertinent imaging. Please see official radiology report.  US Lower Extremity Venous Duplex Bilateral   Final Result   Abnormal   IMPRESSION:   1.  Deep vein thrombosis extending from the left tibioperoneal trunk into the proximal aspect of the left posterior tibial  vein.   2.  No deep venous thrombosis in the right lower extremity.         [Critical Result: New diagnosis of DVT]      Finding was identified on 1/19/2024 11:22 PM CST.       Falguni LEWIS was contacted by me on 1/19/2024 11:27 PM CST and verbalized understanding of the critical result.           EKG:    None    PROCEDURES:   None      I, Christy Jeffers, am serving as a scribe to document services personally performed by Falguni Mesa PA-C based on my observation and the provider's statements to me. I, Falguni Mesa PA-C, attest that Christy Jeffers is acting in a scribe capacity, has observed my performance of the services and has documented them in accordance with my direction.    Falguni Mesa PA-C  Emergency Medicine  [facility] EMERGENCY ROOM  [address] [facility] [id number]  [phone]             Falguni Mesa PA-C  01/19/24 6081

## 2024-07-14 ENCOUNTER — APPOINTMENT (OUTPATIENT)
Dept: CT IMAGING | Facility: CLINIC | Age: 74
End: 2024-07-14
Attending: EMERGENCY MEDICINE
Payer: COMMERCIAL

## 2024-07-14 ENCOUNTER — HOSPITAL ENCOUNTER (EMERGENCY)
Facility: CLINIC | Age: 74
Discharge: HOME | End: 2024-07-14
Attending: EMERGENCY MEDICINE | Admitting: EMERGENCY MEDICINE
Payer: COMMERCIAL

## 2024-07-14 ENCOUNTER — APPOINTMENT (OUTPATIENT)
Dept: MRI IMAGING | Facility: CLINIC | Age: 74
End: 2024-07-14
Attending: EMERGENCY MEDICINE
Payer: COMMERCIAL

## 2024-07-14 VITALS
DIASTOLIC BLOOD PRESSURE: 82 MMHG | HEART RATE: 81 BPM | OXYGEN SATURATION: 94 % | TEMPERATURE: 98.1 F | RESPIRATION RATE: 18 BRPM | SYSTOLIC BLOOD PRESSURE: 135 MMHG

## 2024-07-14 DIAGNOSIS — N39.0 URINARY TRACT INFECTION WITHOUT HEMATURIA, SITE UNSPECIFIED: ICD-10-CM

## 2024-07-14 DIAGNOSIS — H53.8 BLURRED VISION: ICD-10-CM

## 2024-07-14 LAB
ALBUMIN UR-MCNC: NEGATIVE MG/DL
ANION GAP SERPL CALCULATED.3IONS-SCNC: 12 MMOL/L (ref 7–15)
APPEARANCE UR: CLEAR
APTT PPP: 26 SECONDS (ref 22–38)
BACTERIA #/AREA URNS HPF: ABNORMAL /HPF
BASOPHILS # BLD AUTO: 0.1 10E3/UL (ref 0–0.2)
BASOPHILS NFR BLD AUTO: 1 %
BILIRUB UR QL STRIP: NEGATIVE
BUN SERPL-MCNC: 24.6 MG/DL (ref 8–23)
CALCIUM SERPL-MCNC: 9.9 MG/DL (ref 8.8–10.2)
CHLORIDE SERPL-SCNC: 100 MMOL/L (ref 98–107)
COLOR UR AUTO: ABNORMAL
CREAT SERPL-MCNC: 0.94 MG/DL (ref 0.51–0.95)
DEPRECATED HCO3 PLAS-SCNC: 25 MMOL/L (ref 22–29)
EGFRCR SERPLBLD CKD-EPI 2021: 63 ML/MIN/1.73M2
EOSINOPHIL # BLD AUTO: 0.6 10E3/UL (ref 0–0.7)
EOSINOPHIL NFR BLD AUTO: 11 %
ERYTHROCYTE [DISTWIDTH] IN BLOOD BY AUTOMATED COUNT: 13.1 % (ref 10–15)
ERYTHROCYTE [SEDIMENTATION RATE] IN BLOOD BY WESTERGREN METHOD: 12 MM/HR (ref 0–30)
GLUCOSE SERPL-MCNC: 101 MG/DL (ref 70–99)
GLUCOSE UR STRIP-MCNC: NEGATIVE MG/DL
HCT VFR BLD AUTO: 36.9 % (ref 35–47)
HGB BLD-MCNC: 12.3 G/DL (ref 11.7–15.7)
HGB UR QL STRIP: NEGATIVE
HOLD SPECIMEN: NORMAL
HYALINE CASTS: 1 /LPF
IMM GRANULOCYTES # BLD: 0 10E3/UL
IMM GRANULOCYTES NFR BLD: 0 %
INR PPP: 0.94 (ref 0.85–1.15)
KETONES UR STRIP-MCNC: NEGATIVE MG/DL
LEUKOCYTE ESTERASE UR QL STRIP: ABNORMAL
LYMPHOCYTES # BLD AUTO: 1.9 10E3/UL (ref 0.8–5.3)
LYMPHOCYTES NFR BLD AUTO: 35 %
MCH RBC QN AUTO: 31.6 PG (ref 26.5–33)
MCHC RBC AUTO-ENTMCNC: 33.3 G/DL (ref 31.5–36.5)
MCV RBC AUTO: 95 FL (ref 78–100)
MONOCYTES # BLD AUTO: 0.4 10E3/UL (ref 0–1.3)
MONOCYTES NFR BLD AUTO: 7 %
MUCOUS THREADS #/AREA URNS LPF: PRESENT /LPF
NEUTROPHILS # BLD AUTO: 2.5 10E3/UL (ref 1.6–8.3)
NEUTROPHILS NFR BLD AUTO: 45 %
NITRATE UR QL: POSITIVE
NRBC # BLD AUTO: 0 10E3/UL
NRBC BLD AUTO-RTO: 0 /100
PH UR STRIP: 5.5 [PH] (ref 5–7)
PLATELET # BLD AUTO: 307 10E3/UL (ref 150–450)
POTASSIUM SERPL-SCNC: 3.9 MMOL/L (ref 3.4–5.3)
RBC # BLD AUTO: 3.89 10E6/UL (ref 3.8–5.2)
RBC URINE: 1 /HPF
SODIUM SERPL-SCNC: 137 MMOL/L (ref 135–145)
SP GR UR STRIP: 1.01 (ref 1–1.03)
SQUAMOUS EPITHELIAL: 2 /HPF
UROBILINOGEN UR STRIP-MCNC: <2 MG/DL
WBC # BLD AUTO: 5.4 10E3/UL (ref 4–11)
WBC URINE: 19 /HPF

## 2024-07-14 PROCEDURE — 85730 THROMBOPLASTIN TIME PARTIAL: CPT | Performed by: EMERGENCY MEDICINE

## 2024-07-14 PROCEDURE — 85025 COMPLETE CBC W/AUTO DIFF WBC: CPT | Performed by: EMERGENCY MEDICINE

## 2024-07-14 PROCEDURE — 85610 PROTHROMBIN TIME: CPT | Performed by: EMERGENCY MEDICINE

## 2024-07-14 PROCEDURE — 70551 MRI BRAIN STEM W/O DYE: CPT

## 2024-07-14 PROCEDURE — 99285 EMERGENCY DEPT VISIT HI MDM: CPT | Mod: 25

## 2024-07-14 PROCEDURE — 87086 URINE CULTURE/COLONY COUNT: CPT | Performed by: EMERGENCY MEDICINE

## 2024-07-14 PROCEDURE — 81001 URINALYSIS AUTO W/SCOPE: CPT | Performed by: EMERGENCY MEDICINE

## 2024-07-14 PROCEDURE — 80048 BASIC METABOLIC PNL TOTAL CA: CPT | Performed by: EMERGENCY MEDICINE

## 2024-07-14 PROCEDURE — 87186 SC STD MICRODIL/AGAR DIL: CPT | Performed by: EMERGENCY MEDICINE

## 2024-07-14 PROCEDURE — 70450 CT HEAD/BRAIN W/O DYE: CPT

## 2024-07-14 PROCEDURE — 96365 THER/PROPH/DIAG IV INF INIT: CPT

## 2024-07-14 PROCEDURE — 85652 RBC SED RATE AUTOMATED: CPT | Performed by: EMERGENCY MEDICINE

## 2024-07-14 PROCEDURE — 250N000011 HC RX IP 250 OP 636: Performed by: EMERGENCY MEDICINE

## 2024-07-14 PROCEDURE — 36415 COLL VENOUS BLD VENIPUNCTURE: CPT | Performed by: EMERGENCY MEDICINE

## 2024-07-14 RX ORDER — CEFTRIAXONE 1 G/1
1 INJECTION, POWDER, FOR SOLUTION INTRAMUSCULAR; INTRAVENOUS ONCE
Status: COMPLETED | OUTPATIENT
Start: 2024-07-14 | End: 2024-07-14

## 2024-07-14 RX ADMIN — CEFTRIAXONE 1 G: 1 INJECTION, POWDER, FOR SOLUTION INTRAMUSCULAR; INTRAVENOUS at 20:01

## 2024-07-14 ASSESSMENT — ACTIVITIES OF DAILY LIVING (ADL)
ADLS_ACUITY_SCORE: 35

## 2024-07-14 NOTE — ED PROVIDER NOTES
EMERGENCY DEPARTMENT ENCOUNTER      NAME: Stephanie Acuna  AGE: 74 year old female  YOB: 1950  MRN: [id number]  EVALUATION DATE & TIME: 7/14/2024  5:17 PM    PCP: Clara Franz    ED PROVIDER: Blanka Lemus DO      Chief Complaint   Patient presents with    Head Injury    Eye Problem         FINAL IMPRESSION:  1. Blurred vision    2. Urinary tract infection without hematuria, site unspecified          ED COURSE & MEDICAL DECISION MAKING:    Pertinent Labs & Imaging studies reviewed. (See chart for details)  6:04 PM I met the patient and performed my initial interview and exam.    74 year old female presents to the Emergency Department for evaluation of reported blurred vision, seems off.  Symptoms started today.  History obtained from daughter as patient with a history of dementia.  Unable to provide reliable history.  Had a fall on 7/4 and seen at outside ED.  Imaging showed a small subarachnoid hemorrhage and she was observed overnight.  Sounds like she has been doing well at home.  Another fall this week.  Daughter reports she fell off today.  Patient reports some blurred vision however unable to quantify.  Says yes when fast she has double vision.  She is able to count fingers easily in all quadrants of the eyes except she misses a few on the peripheral of the left eye.  She does have a large hematoma there is unclear if this is truly something in her visual field or if there is just an obstruction from the hematoma.  Her sclera are clear.  Pupils are equal round and reactive.  She notes some temporal tenderness.  ESR not elevated to the acute abnormality.  Labs otherwise reassuring.  Urinalysis is nitrite positive, given change we will treat with ceftriaxone.  We did obtain an MRI to rule out stroke and this was unremarkable.  Patient at her baseline here.  Plan on discharge with antibiotics for UTI.  Since like she is following up with neurology this week.  Encouraged return if any acute  worsening of symptoms    At the conclusion of the encounter I discussed the results of all of the tests and the disposition. The questions were answered. The patient or family acknowledged understanding and was agreeable with the care plan.     Medical Decision Making  Obtained supplemental history:Supplemental history obtained?: Documented in chart and Family Member/Significant Other  Reviewed external records: External records reviewed?: Documented in chart and Outpatient Record: 7/4/2024 at [facility]  Care impacted by chronic illness:Other: Alzheimer's disease   Care significantly affected by social determinants of health:N/A  Did you consider but not order tests?: Work up considered but not performed and documented in chart, if applicable  Did you interpret images independently?: Independent interpretation of ECG and images noted in documentation, when applicable.  Consultation discussion with other provider:Did you involve another provider (consultant, , pharmacy, etc.)?: No  Discharge. I prescribed additional prescription strength medication(s) as charted. See documentation for any additional details.      MEDICATIONS GIVEN IN THE EMERGENCY:  Medications   cefTRIAXone (ROCEPHIN) 1 g vial to attach to  mL bag for ADULTS or NS 50 mL bag for PEDS (0 g Intravenous Stopped 7/14/24 2043)       NEW PRESCRIPTIONS STARTED AT TODAY'S ER VISIT  New Prescriptions    CEPHALEXIN (KEFLEX) 250 MG CAPSULE    Take 1 capsule (250 mg) by mouth 4 times daily          =================================================================    HPI    Patient information was obtained from: Patient, Patient's Daughter    Use of : None        Stephanie Acuna is a 74 year old female with a pertinent history of alzheimer who presents to this ED by private car with her daughter for evaluation of head and left eye injury.     Per chart review, patient was seen on 7/4/2024 at [place]  [facility] for evaluation of a fall. Patient had a CT head w/o IV contrast, CT cervical spine w/o IV contrast, CT head w/o IV contrast, and CT maxillofacial w/o IV contrast done. The CT Head w/o IV contrast found acute left-greater-than-right temporoparietal subarachnoid hemorrhages. Scalp soft tissue injury. Patient was admitted for observation for repeat CT of the head. Patient was stable enough for discharge.     Patient recently fell off her bed, hit her head, and was seen in the ED on 7/4/2024. Patient was seen by her PCP on 7/12/2024 and nothing out of the ordinary was found. At 10 AM today, patient told her daughter that her \"vision was not right\" and that she is unable to see. Patient is scheduled to see a neurologist on 7/18/2024, but due to concerns with her eyes, her daughter brought her into the ED.     Patient bruised her left forehead/eyelid from the fall. No pain in her eye. No colorful discharge from her eye but it has been more watery. Does not take any blood thinners. Ever since her fall, she has been taking Tylenol. Denies any changes to her gait, weakness, tingling/numbness, facial droop, fever, upper respiratory tract infections, urinary symptoms, abdominal pain, or neck pain. Patient currently lives with her daughter.       REVIEW OF SYSTEMS   Per HPI    PAST MEDICAL HISTORY:  No past medical history on file.    PAST SURGICAL HISTORY:  Past Surgical History:   Procedure Laterality Date    HYSTERECTOMY  Early 1990's    OOPHORECTOMY Bilateral Early 1990's           CURRENT MEDICATIONS:    cephALEXin (KEFLEX) 250 MG capsule         ALLERGIES:  Allergies   Allergen Reactions    Amlodipine Swelling    Escitalopram Other (See Comments)     Other Reaction(s): Insomnia/personailty changes    And personality changes    Loratadine      Other Reaction(s): Very dry    Sulfa Antibiotics Unknown       FAMILY HISTORY:  Family History   Problem Relation Age of Onset    Ovarian " Cancer Maternal Grandmother        SOCIAL HISTORY:   Social History     Socioeconomic History    Marital status: Single     Social Determinants of Health     Food Insecurity: No Food Insecurity (7/4/2024)    Received from [facility]    Hunger Vital Sign     Worried About Running Out of Food in the Last Year: Never true     Ran Out of Food in the Last Year: Never true   Transportation Needs: No Transportation Needs (7/4/2024)    Received from [facility]    PRAPARE - Transportation     Lack of Transportation (Medical): No     Lack of Transportation (Non-Medical): No   Interpersonal Safety: Not At Risk (7/4/2024)    Received from [facility]    EH IP Custom IPV     Do you feel UNSAFE in any of your personal relationships with your family members or any other acquaintances?: No   Housing Stability: Low Risk  (7/4/2024)    Received from [facility]    Housing Stability Vital Sign     Unable to Pay for Housing in the Last Year: No     Number of Times Moved in the Last Year: 0     Homeless in the Last Year: No       VITALS:  /61   Pulse 67   Temp 98.1  F (36.7  C) (Temporal)   Resp 18   SpO2 98%     PHYSICAL EXAM    Physical Exam  Constitutional:       General: She is not in acute distress.  HENT:      Head: Normocephalic.      Comments: Old appearing hematoma to left orbit     Right Ear: Tympanic membrane normal.      Left Ear: Tympanic membrane normal.      Mouth/Throat:      Pharynx: Oropharynx is clear.   Eyes:      General: Lids are normal. Vision grossly intact.         Right eye: No discharge.         Left eye: No discharge.      Extraocular Movements: Extraocular movements intact.      Conjunctiva/sclera: Conjunctivae normal.      Pupils: Pupils are equal, round, and reactive to light.   Cardiovascular:      Rate and Rhythm: Normal rate and regular rhythm.       Pulses: Normal pulses.      Heart sounds: Normal heart sounds.   Pulmonary:      Effort: Pulmonary effort is normal.      Breath sounds: Normal breath sounds.   Abdominal:      General: Abdomen is flat. Bowel sounds are normal.      Palpations: Abdomen is soft.      Tenderness: There is no abdominal tenderness.   Musculoskeletal:         General: Normal range of motion.   Skin:     General: Skin is warm and dry.      Capillary Refill: Capillary refill takes less than 2 seconds.   Neurological:      Mental Status: She is alert. Mental status is at baseline. She is confused.      Sensory: Sensation is intact.      Motor: Motor function is intact.      Coordination: Coordination normal.      Gait: Gait is intact.      Comments: Can count fingers in all visual fields however has some difficulty in periphery of left eye.  Difficulty following instructions at times           LAB:  All pertinent labs reviewed and interpreted.  Labs Ordered and Resulted from Time of ED Arrival to Time of ED Departure   BASIC METABOLIC PANEL - Abnormal       Result Value    Sodium 137      Potassium 3.9      Chloride 100      Carbon Dioxide (CO2) 25      Anion Gap 12      Urea Nitrogen 24.6 (*)     Creatinine 0.94      GFR Estimate 63      Calcium 9.9      Glucose 101 (*)    UA MACROSCOPIC WITH REFLEX TO MICRO AND CULTURE - Abnormal    Color Urine Light Yellow      Appearance Urine Clear      Glucose Urine Negative      Bilirubin Urine Negative      Ketones Urine Negative      Specific Gravity Urine 1.014      Blood Urine Negative      pH Urine 5.5      Protein Albumin Urine Negative      Urobilinogen Urine <2.0      Nitrite Urine Positive (*)     Leukocyte Esterase Urine 75 Davion/uL (*)     Bacteria Urine Moderate (*)     Mucus Urine Present (*)     RBC Urine 1      WBC Urine 19 (*)     Squamous Epithelials Urine 2 (*)     Hyaline Casts Urine 1     INR - Normal    INR 0.94     PARTIAL THROMBOPLASTIN TIME - Normal    aPTT 26     ERYTHROCYTE  SEDIMENTATION RATE AUTO - Normal    Erythrocyte Sedimentation Rate 12     CBC WITH PLATELETS AND DIFFERENTIAL    WBC Count 5.4      RBC Count 3.89      Hemoglobin 12.3      Hematocrit 36.9      MCV 95      MCH 31.6      MCHC 33.3      RDW 13.1      Platelet Count 307      % Neutrophils 45      % Lymphocytes 35      % Monocytes 7      % Eosinophils 11      % Basophils 1      % Immature Granulocytes 0      NRBCs per 100 WBC 0      Absolute Neutrophils 2.5      Absolute Lymphocytes 1.9      Absolute Monocytes 0.4      Absolute Eosinophils 0.6      Absolute Basophils 0.1      Absolute Immature Granulocytes 0.0      Absolute NRBCs 0.0     URINE CULTURE       RADIOLOGY:  Reviewed all pertinent imaging. Please see official radiology report.  MR Brain w/o Contrast   Final Result   IMPRESSION:   1.  Normal head MRI.      Head CT w/o contrast   Final Result   IMPRESSION:   1.  No acute intracranial process.   2.  Left frontal and periorbital scalp hematomas without underlying fracture.          I, Shannon Peraza, am serving as a scribe to document services personally performed by Dr. Blanka Lemus based on my observation and the provider's statements to me. IBlanka, DO attest that Shannon Peraza is acting in a scribe capacity, has observed my performance of the services and has documented them in accordance with my direction.    Blanka Lemus DO  Emergency Medicine  [facility] EMERGENCY ROOM  [address] [address] [phone]  Dept: [phone]     Blanka Lemus DO  07/15/24 0035

## 2024-07-14 NOTE — ED TRIAGE NOTES
Pt presents to the ED with c/o blurred vision, head injury. Pt was dx with subarachnoid hemorrhage on 7/4 at hospital in [place]. Pt was released after repeat imaging did not show changes. Daughter with pt, endorses hx of alzheimer's. States that pt started c/o blurred vision today. Pt states it is both eyes. Was seen at [facility], sent here for further assessment. Daughter states pt has been more lethargic recently. Not on blood thinners.     Triage Assessment (Adult)       Row Name 07/14/24 2724          Triage Assessment    Airway WDL WDL        Respiratory WDL    Respiratory WDL WDL        Skin Circulation/Temperature WDL    Skin Circulation/Temperature WDL WDL        Cardiac WDL    Cardiac WDL WDL        Peripheral/Neurovascular WDL    Peripheral Neurovascular WDL WDL

## 2024-07-15 ASSESSMENT — VISUAL ACUITY: OU: 1

## 2024-07-16 ENCOUNTER — TELEPHONE (OUTPATIENT)
Dept: NURSING | Facility: CLINIC | Age: 74
End: 2024-07-16
Payer: COMMERCIAL

## 2024-07-16 LAB — BACTERIA UR CULT: ABNORMAL

## 2024-07-16 NOTE — LETTER
July 16, 2024        Stephanie Acuna  [address] [address]          Dear Stephanie Acuna:    You were seen in the [facility] Emergency Department at [facility] on 7/16/2024.  We are unable to reach you by phone, so we are sending you this letter.     It is important that you call [facility] Emergency Department lab result nurse at [phone], as we have information to relay to you AND/OR we MAY have to make some changes in your treatment.    Best time to call back is between 9AM and 5:30PM, 7 days a week.      Sincerely,     [facility] Emergency Department Lab Result RN  [phone]

## 2024-07-16 NOTE — TELEPHONE ENCOUNTER
[facility]     Reason for call: Lab Result Notification     Lab Result (including Rx patient on, if applicable).  If culture, copy of lab report at bottom.  Lab Result: See below  cephALEXin (KEFLEX) 250 MG capsule - Take 1 capsule (250 mg) by mouth 4 times daily SUSCEPTIBLE   Creatinine Level (mg/dl)   Creatinine   Date Value Ref Range Status   07/14/2024 0.94 0.51 - 0.95 mg/dL Final    Creatinine clearance (ml/min), if applicable    Creatinine clearance cannot be calculated (Unknown ideal weight.)     RN Recommendations/Instructions per [place] ED lab result protocol:   [facility] ED lab result protocol utilized: urine culture    Unable to reach patient/caregiver. Did not leave a voicemail as there is no consent on file.     Letter pended to be sent via USPS mail.       Ronald Montero RN

## 2024-07-24 NOTE — TELEPHONE ENCOUNTER
Patient and her daughter Elizabeth calling back. States that Stephanie is feeling much better. She had an appointment today with her PCP and they doing a follow up urine culture. Patient had no further questions.     BRENDAN DAMIAN RN

## 2024-08-27 ENCOUNTER — LAB REQUISITION (OUTPATIENT)
Dept: LAB | Facility: CLINIC | Age: 74
End: 2024-08-27
Payer: COMMERCIAL

## 2024-08-27 DIAGNOSIS — F03.90 UNSPECIFIED DEMENTIA, UNSPECIFIED SEVERITY, WITHOUT BEHAVIORAL DISTURBANCE, PSYCHOTIC DISTURBANCE, MOOD DISTURBANCE, AND ANXIETY (H): ICD-10-CM

## 2024-08-28 LAB
ALBUMIN SERPL BCG-MCNC: 4.3 G/DL (ref 3.5–5.2)
ALP SERPL-CCNC: 90 U/L (ref 40–150)
ALT SERPL W P-5'-P-CCNC: 12 U/L (ref 0–50)
ANION GAP SERPL CALCULATED.3IONS-SCNC: 17 MMOL/L (ref 7–15)
AST SERPL W P-5'-P-CCNC: 25 U/L (ref 0–45)
BASOPHILS # BLD AUTO: 0.1 10E3/UL (ref 0–0.2)
BASOPHILS NFR BLD AUTO: 1 %
BILIRUB SERPL-MCNC: 0.3 MG/DL
BUN SERPL-MCNC: 30.4 MG/DL (ref 8–23)
CALCIUM SERPL-MCNC: 9.6 MG/DL (ref 8.8–10.4)
CHLORIDE SERPL-SCNC: 102 MMOL/L (ref 98–107)
CREAT SERPL-MCNC: 0.99 MG/DL (ref 0.51–0.95)
EGFRCR SERPLBLD CKD-EPI 2021: 59 ML/MIN/1.73M2
EOSINOPHIL # BLD AUTO: 0.7 10E3/UL (ref 0–0.7)
EOSINOPHIL NFR BLD AUTO: 8 %
ERYTHROCYTE [DISTWIDTH] IN BLOOD BY AUTOMATED COUNT: 13.5 % (ref 10–15)
GLUCOSE SERPL-MCNC: 140 MG/DL (ref 70–99)
HCO3 SERPL-SCNC: 19 MMOL/L (ref 22–29)
HCT VFR BLD AUTO: 40.5 % (ref 35–47)
HGB BLD-MCNC: 12.9 G/DL (ref 11.7–15.7)
IMM GRANULOCYTES # BLD: 0 10E3/UL
IMM GRANULOCYTES NFR BLD: 0 %
LYMPHOCYTES # BLD AUTO: 1.9 10E3/UL (ref 0.8–5.3)
LYMPHOCYTES NFR BLD AUTO: 23 %
MCH RBC QN AUTO: 32.3 PG (ref 26.5–33)
MCHC RBC AUTO-ENTMCNC: 31.9 G/DL (ref 31.5–36.5)
MCV RBC AUTO: 101 FL (ref 78–100)
MONOCYTES # BLD AUTO: 0.6 10E3/UL (ref 0–1.3)
MONOCYTES NFR BLD AUTO: 7 %
NEUTROPHILS # BLD AUTO: 5.3 10E3/UL (ref 1.6–8.3)
NEUTROPHILS NFR BLD AUTO: 61 %
NRBC # BLD AUTO: 0 10E3/UL
NRBC BLD AUTO-RTO: 0 /100
PLATELET # BLD AUTO: 281 10E3/UL (ref 150–450)
POTASSIUM SERPL-SCNC: 4.1 MMOL/L (ref 3.4–5.3)
PROT SERPL-MCNC: 6.8 G/DL (ref 6.4–8.3)
RBC # BLD AUTO: 4 10E6/UL (ref 3.8–5.2)
SODIUM SERPL-SCNC: 138 MMOL/L (ref 135–145)
TSH SERPL DL<=0.005 MIU/L-ACNC: 2.37 UIU/ML (ref 0.3–4.2)
VIT B12 SERPL-MCNC: 762 PG/ML (ref 232–1245)
WBC # BLD AUTO: 8.6 10E3/UL (ref 4–11)

## 2024-08-28 PROCEDURE — P9604 ONE-WAY ALLOW PRORATED TRIP: HCPCS | Mod: ORL | Performed by: INTERNAL MEDICINE

## 2024-08-28 PROCEDURE — 84443 ASSAY THYROID STIM HORMONE: CPT | Mod: ORL | Performed by: INTERNAL MEDICINE

## 2024-08-28 PROCEDURE — 82607 VITAMIN B-12: CPT | Mod: ORL | Performed by: INTERNAL MEDICINE

## 2024-08-28 PROCEDURE — 36415 COLL VENOUS BLD VENIPUNCTURE: CPT | Mod: ORL | Performed by: INTERNAL MEDICINE

## 2024-08-28 PROCEDURE — 80053 COMPREHEN METABOLIC PANEL: CPT | Mod: ORL | Performed by: INTERNAL MEDICINE

## 2024-08-28 PROCEDURE — 85025 COMPLETE CBC W/AUTO DIFF WBC: CPT | Mod: ORL | Performed by: INTERNAL MEDICINE

## 2024-08-29 ENCOUNTER — LAB REQUISITION (OUTPATIENT)
Dept: LAB | Facility: CLINIC | Age: 74
End: 2024-08-29
Payer: COMMERCIAL

## 2024-08-29 DIAGNOSIS — R30.0 DYSURIA: ICD-10-CM

## 2024-08-29 LAB
ALBUMIN UR-MCNC: NEGATIVE MG/DL
APPEARANCE UR: CLEAR
BILIRUB UR QL STRIP: NEGATIVE
COLOR UR AUTO: ABNORMAL
GLUCOSE UR STRIP-MCNC: NEGATIVE MG/DL
HGB UR QL STRIP: NEGATIVE
KETONES UR STRIP-MCNC: NEGATIVE MG/DL
LEUKOCYTE ESTERASE UR QL STRIP: ABNORMAL
MUCOUS THREADS #/AREA URNS LPF: PRESENT /LPF
NITRATE UR QL: NEGATIVE
PH UR STRIP: 5.5 [PH] (ref 5–7)
RBC URINE: 1 /HPF
SP GR UR STRIP: 1.01 (ref 1–1.03)
SQUAMOUS EPITHELIAL: 3 /HPF
TRANSITIONAL EPI: <1 /HPF
UROBILINOGEN UR STRIP-MCNC: NORMAL MG/DL
WBC URINE: 15 /HPF

## 2024-08-29 PROCEDURE — 87086 URINE CULTURE/COLONY COUNT: CPT | Mod: ORL | Performed by: INTERNAL MEDICINE

## 2024-08-29 PROCEDURE — 81001 URINALYSIS AUTO W/SCOPE: CPT | Mod: ORL | Performed by: INTERNAL MEDICINE

## 2024-08-30 LAB — BACTERIA UR CULT: NORMAL

## 2025-01-07 ENCOUNTER — LAB REQUISITION (OUTPATIENT)
Dept: LAB | Facility: CLINIC | Age: 75
End: 2025-01-07
Payer: COMMERCIAL

## 2025-01-07 DIAGNOSIS — F02.80 DEMENTIA IN OTHER DISEASES CLASSIFIED ELSEWHERE, UNSPECIFIED SEVERITY, WITHOUT BEHAVIORAL DISTURBANCE, PSYCHOTIC DISTURBANCE, MOOD DISTURBANCE, AND ANXIETY (H): ICD-10-CM

## 2025-01-07 PROCEDURE — 84443 ASSAY THYROID STIM HORMONE: CPT | Mod: ORL | Performed by: INTERNAL MEDICINE

## 2025-01-07 PROCEDURE — 36415 COLL VENOUS BLD VENIPUNCTURE: CPT | Mod: ORL | Performed by: INTERNAL MEDICINE

## 2025-01-07 PROCEDURE — P9604 ONE-WAY ALLOW PRORATED TRIP: HCPCS | Mod: ORL | Performed by: INTERNAL MEDICINE

## 2025-01-07 PROCEDURE — 80053 COMPREHEN METABOLIC PANEL: CPT | Mod: ORL | Performed by: INTERNAL MEDICINE

## 2025-01-07 PROCEDURE — 85027 COMPLETE CBC AUTOMATED: CPT | Mod: ORL | Performed by: INTERNAL MEDICINE

## 2025-01-08 LAB
ALBUMIN SERPL BCG-MCNC: 3.8 G/DL (ref 3.5–5.2)
ALP SERPL-CCNC: 81 U/L (ref 40–150)
ALT SERPL W P-5'-P-CCNC: 15 U/L (ref 0–50)
ANION GAP SERPL CALCULATED.3IONS-SCNC: 14 MMOL/L (ref 7–15)
AST SERPL W P-5'-P-CCNC: 51 U/L (ref 0–45)
BILIRUB SERPL-MCNC: 0.3 MG/DL
BUN SERPL-MCNC: 30.3 MG/DL (ref 8–23)
CALCIUM SERPL-MCNC: 9.7 MG/DL (ref 8.8–10.4)
CHLORIDE SERPL-SCNC: 100 MMOL/L (ref 98–107)
CREAT SERPL-MCNC: 1.03 MG/DL (ref 0.51–0.95)
EGFRCR SERPLBLD CKD-EPI 2021: 56 ML/MIN/1.73M2
ERYTHROCYTE [DISTWIDTH] IN BLOOD BY AUTOMATED COUNT: 13.4 % (ref 10–15)
GLUCOSE SERPL-MCNC: 120 MG/DL (ref 70–99)
HCO3 SERPL-SCNC: 21 MMOL/L (ref 22–29)
HCT VFR BLD AUTO: 37 % (ref 35–47)
HGB BLD-MCNC: 12.4 G/DL (ref 11.7–15.7)
MCH RBC QN AUTO: 32.7 PG (ref 26.5–33)
MCHC RBC AUTO-ENTMCNC: 33.5 G/DL (ref 31.5–36.5)
MCV RBC AUTO: 98 FL (ref 78–100)
PLATELET # BLD AUTO: 290 10E3/UL (ref 150–450)
POTASSIUM SERPL-SCNC: 4.9 MMOL/L (ref 3.4–5.3)
PROT SERPL-MCNC: 6.4 G/DL (ref 6.4–8.3)
RBC # BLD AUTO: 3.79 10E6/UL (ref 3.8–5.2)
SODIUM SERPL-SCNC: 135 MMOL/L (ref 135–145)
TSH SERPL DL<=0.005 MIU/L-ACNC: 1.95 UIU/ML (ref 0.3–4.2)
WBC # BLD AUTO: 9.5 10E3/UL (ref 4–11)

## 2025-01-13 ENCOUNTER — TRANSFERRED RECORDS (OUTPATIENT)
Dept: HEALTH INFORMATION MANAGEMENT | Facility: CLINIC | Age: 75
End: 2025-01-13
Payer: COMMERCIAL

## 2025-01-14 ENCOUNTER — APPOINTMENT (OUTPATIENT)
Dept: CT IMAGING | Facility: CLINIC | Age: 75
DRG: 190 | End: 2025-01-14
Attending: EMERGENCY MEDICINE
Payer: COMMERCIAL

## 2025-01-14 ENCOUNTER — HOSPITAL ENCOUNTER (INPATIENT)
Facility: CLINIC | Age: 75
DRG: 190 | End: 2025-01-14
Attending: EMERGENCY MEDICINE | Admitting: FAMILY MEDICINE
Payer: COMMERCIAL

## 2025-01-14 ENCOUNTER — MEDICAL CORRESPONDENCE (OUTPATIENT)
Dept: MEDSURG UNIT | Facility: CLINIC | Age: 75
End: 2025-01-14

## 2025-01-14 DIAGNOSIS — R91.8 PULMONARY INFILTRATE ON CHEST X-RAY: ICD-10-CM

## 2025-01-14 DIAGNOSIS — R05.9 COUGH, UNSPECIFIED TYPE: ICD-10-CM

## 2025-01-14 DIAGNOSIS — R09.02 HYPOXIA: ICD-10-CM

## 2025-01-14 DIAGNOSIS — J47.1 BRONCHIECTASIS WITH ACUTE EXACERBATION (H): Primary | ICD-10-CM

## 2025-01-14 DIAGNOSIS — R06.00 DYSPNEA, UNSPECIFIED TYPE: ICD-10-CM

## 2025-01-14 DIAGNOSIS — N17.9 ACUTE RENAL FAILURE, UNSPECIFIED ACUTE RENAL FAILURE TYPE: ICD-10-CM

## 2025-01-14 LAB
ANION GAP SERPL CALCULATED.3IONS-SCNC: 14 MMOL/L (ref 7–15)
ATRIAL RATE - MUSE: 83 BPM
BASOPHILS # BLD AUTO: 0.1 10E3/UL (ref 0–0.2)
BASOPHILS NFR BLD AUTO: 1 %
BUN SERPL-MCNC: 35.7 MG/DL (ref 8–23)
CALCIUM SERPL-MCNC: 9 MG/DL (ref 8.8–10.4)
CHLORIDE SERPL-SCNC: 100 MMOL/L (ref 98–107)
CREAT SERPL-MCNC: 1.15 MG/DL (ref 0.51–0.95)
CREAT SERPL-MCNC: 1.49 MG/DL (ref 0.51–0.95)
DIASTOLIC BLOOD PRESSURE - MUSE: 74 MMHG
EGFRCR SERPLBLD CKD-EPI 2021: 36 ML/MIN/1.73M2
EGFRCR SERPLBLD CKD-EPI 2021: 49 ML/MIN/1.73M2
EOSINOPHIL # BLD AUTO: 0.9 10E3/UL (ref 0–0.7)
EOSINOPHIL NFR BLD AUTO: 10 %
ERYTHROCYTE [DISTWIDTH] IN BLOOD BY AUTOMATED COUNT: 13.3 % (ref 10–15)
FLUAV RNA SPEC QL NAA+PROBE: NEGATIVE
FLUBV RNA RESP QL NAA+PROBE: NEGATIVE
GLUCOSE SERPL-MCNC: 113 MG/DL (ref 70–99)
HCO3 SERPL-SCNC: 25 MMOL/L (ref 22–29)
HCT VFR BLD AUTO: 35.5 % (ref 35–47)
HGB BLD-MCNC: 11.8 G/DL (ref 11.7–15.7)
HOLD SPECIMEN: NORMAL
IMM GRANULOCYTES # BLD: 0 10E3/UL
IMM GRANULOCYTES NFR BLD: 1 %
INTERPRETATION ECG - MUSE: NORMAL
LYMPHOCYTES # BLD AUTO: 1.4 10E3/UL (ref 0.8–5.3)
LYMPHOCYTES NFR BLD AUTO: 17 %
MCH RBC QN AUTO: 32.2 PG (ref 26.5–33)
MCHC RBC AUTO-ENTMCNC: 33.2 G/DL (ref 31.5–36.5)
MCV RBC AUTO: 97 FL (ref 78–100)
MONOCYTES # BLD AUTO: 0.7 10E3/UL (ref 0–1.3)
MONOCYTES NFR BLD AUTO: 8 %
NEUTROPHILS # BLD AUTO: 5.4 10E3/UL (ref 1.6–8.3)
NEUTROPHILS NFR BLD AUTO: 64 %
NRBC # BLD AUTO: 0 10E3/UL
NRBC BLD AUTO-RTO: 0 /100
NT-PROBNP SERPL-MCNC: 465 PG/ML (ref 0–1800)
P AXIS - MUSE: 5 DEGREES
PLATELET # BLD AUTO: 291 10E3/UL (ref 150–450)
POTASSIUM SERPL-SCNC: 4.1 MMOL/L (ref 3.4–5.3)
PR INTERVAL - MUSE: 154 MS
QRS DURATION - MUSE: 78 MS
QT - MUSE: 392 MS
QTC - MUSE: 460 MS
R AXIS - MUSE: 54 DEGREES
RBC # BLD AUTO: 3.66 10E6/UL (ref 3.8–5.2)
RSV RNA SPEC NAA+PROBE: NEGATIVE
SARS-COV-2 RNA RESP QL NAA+PROBE: NEGATIVE
SODIUM SERPL-SCNC: 139 MMOL/L (ref 135–145)
SYSTOLIC BLOOD PRESSURE - MUSE: 144 MMHG
T AXIS - MUSE: 49 DEGREES
TROPONIN T SERPL HS-MCNC: 30 NG/L
TROPONIN T SERPL HS-MCNC: 31 NG/L
VENTRICULAR RATE- MUSE: 83 BPM
WBC # BLD AUTO: 8.4 10E3/UL (ref 4–11)

## 2025-01-14 PROCEDURE — 250N000011 HC RX IP 250 OP 636: Performed by: EMERGENCY MEDICINE

## 2025-01-14 PROCEDURE — 999N000157 HC STATISTIC RCP TIME EA 10 MIN

## 2025-01-14 PROCEDURE — 250N000009 HC RX 250

## 2025-01-14 PROCEDURE — 258N000003 HC RX IP 258 OP 636: Performed by: EMERGENCY MEDICINE

## 2025-01-14 PROCEDURE — 87637 SARSCOV2&INF A&B&RSV AMP PRB: CPT | Performed by: EMERGENCY MEDICINE

## 2025-01-14 PROCEDURE — 71275 CT ANGIOGRAPHY CHEST: CPT

## 2025-01-14 PROCEDURE — 94640 AIRWAY INHALATION TREATMENT: CPT

## 2025-01-14 PROCEDURE — 83880 ASSAY OF NATRIURETIC PEPTIDE: CPT | Performed by: EMERGENCY MEDICINE

## 2025-01-14 PROCEDURE — 80048 BASIC METABOLIC PNL TOTAL CA: CPT | Performed by: EMERGENCY MEDICINE

## 2025-01-14 PROCEDURE — 82565 ASSAY OF CREATININE: CPT

## 2025-01-14 PROCEDURE — 93005 ELECTROCARDIOGRAM TRACING: CPT | Performed by: EMERGENCY MEDICINE

## 2025-01-14 PROCEDURE — 99285 EMERGENCY DEPT VISIT HI MDM: CPT | Mod: 25

## 2025-01-14 PROCEDURE — 250N000011 HC RX IP 250 OP 636

## 2025-01-14 PROCEDURE — 84484 ASSAY OF TROPONIN QUANT: CPT | Performed by: EMERGENCY MEDICINE

## 2025-01-14 PROCEDURE — 36415 COLL VENOUS BLD VENIPUNCTURE: CPT | Performed by: EMERGENCY MEDICINE

## 2025-01-14 PROCEDURE — 85025 COMPLETE CBC W/AUTO DIFF WBC: CPT | Performed by: EMERGENCY MEDICINE

## 2025-01-14 PROCEDURE — 36415 COLL VENOUS BLD VENIPUNCTURE: CPT

## 2025-01-14 PROCEDURE — 120N000001 HC R&B MED SURG/OB

## 2025-01-14 PROCEDURE — 82310 ASSAY OF CALCIUM: CPT | Performed by: EMERGENCY MEDICINE

## 2025-01-14 RX ORDER — AMOXICILLIN 250 MG
1 CAPSULE ORAL 2 TIMES DAILY PRN
Status: DISCONTINUED | OUTPATIENT
Start: 2025-01-14 | End: 2025-01-20 | Stop reason: HOSPADM

## 2025-01-14 RX ORDER — IOPAMIDOL 755 MG/ML
75 INJECTION, SOLUTION INTRAVASCULAR ONCE
Status: COMPLETED | OUTPATIENT
Start: 2025-01-14 | End: 2025-01-14

## 2025-01-14 RX ORDER — BUDESONIDE AND FORMOTEROL FUMARATE DIHYDRATE 160; 4.5 UG/1; UG/1
2 AEROSOL RESPIRATORY (INHALATION) 2 TIMES DAILY
COMMUNITY

## 2025-01-14 RX ORDER — FLUTICASONE FUROATE AND VILANTEROL 100; 25 UG/1; UG/1
1 POWDER RESPIRATORY (INHALATION) DAILY
Status: DISCONTINUED | OUTPATIENT
Start: 2025-01-15 | End: 2025-01-20 | Stop reason: HOSPADM

## 2025-01-14 RX ORDER — LOSARTAN POTASSIUM 50 MG/1
1.5 TABLET ORAL EVERY MORNING
COMMUNITY
Start: 2024-12-23

## 2025-01-14 RX ORDER — HYDROCHLOROTHIAZIDE 25 MG/1
50 TABLET ORAL EVERY MORNING
COMMUNITY

## 2025-01-14 RX ORDER — LIDOCAINE 40 MG/G
CREAM TOPICAL
Status: DISCONTINUED | OUTPATIENT
Start: 2025-01-14 | End: 2025-01-20 | Stop reason: HOSPADM

## 2025-01-14 RX ORDER — CETIRIZINE HYDROCHLORIDE 10 MG/1
1 TABLET ORAL EVERY MORNING
COMMUNITY
Start: 2025-01-07

## 2025-01-14 RX ORDER — BUPROPION HYDROCHLORIDE 150 MG/1
150 TABLET ORAL EVERY MORNING
COMMUNITY

## 2025-01-14 RX ORDER — MULTIVIT-MIN/FA/LYCOPEN/LUTEIN .4-300-25
1 TABLET ORAL EVERY MORNING
COMMUNITY

## 2025-01-14 RX ORDER — HYDROCHLOROTHIAZIDE 25 MG/1
50 TABLET ORAL EVERY MORNING
Status: DISCONTINUED | OUTPATIENT
Start: 2025-01-15 | End: 2025-01-20 | Stop reason: HOSPADM

## 2025-01-14 RX ORDER — ACETAMINOPHEN 500 MG
1000 TABLET ORAL 3 TIMES DAILY PRN
Status: DISCONTINUED | OUTPATIENT
Start: 2025-01-14 | End: 2025-01-20 | Stop reason: HOSPADM

## 2025-01-14 RX ORDER — ENOXAPARIN SODIUM 100 MG/ML
40 INJECTION SUBCUTANEOUS EVERY 24 HOURS
Status: DISCONTINUED | OUTPATIENT
Start: 2025-01-14 | End: 2025-01-20 | Stop reason: HOSPADM

## 2025-01-14 RX ORDER — CEFTRIAXONE 2 G/1
2 INJECTION, POWDER, FOR SOLUTION INTRAMUSCULAR; INTRAVENOUS ONCE
Status: COMPLETED | OUTPATIENT
Start: 2025-01-14 | End: 2025-01-14

## 2025-01-14 RX ORDER — CETIRIZINE HYDROCHLORIDE 10 MG/1
10 TABLET ORAL EVERY MORNING
Status: DISCONTINUED | OUTPATIENT
Start: 2025-01-15 | End: 2025-01-20 | Stop reason: HOSPADM

## 2025-01-14 RX ORDER — AZITHROMYCIN 500 MG/5ML
500 INJECTION, POWDER, LYOPHILIZED, FOR SOLUTION INTRAVENOUS ONCE
Status: COMPLETED | OUTPATIENT
Start: 2025-01-14 | End: 2025-01-14

## 2025-01-14 RX ORDER — DONEPEZIL HYDROCHLORIDE 10 MG/1
10 TABLET, FILM COATED ORAL AT BEDTIME
COMMUNITY

## 2025-01-14 RX ORDER — AMOXICILLIN 250 MG
2 CAPSULE ORAL 2 TIMES DAILY PRN
Status: DISCONTINUED | OUTPATIENT
Start: 2025-01-14 | End: 2025-01-20 | Stop reason: HOSPADM

## 2025-01-14 RX ORDER — CEFTRIAXONE 2 G/1
2 INJECTION, POWDER, FOR SOLUTION INTRAMUSCULAR; INTRAVENOUS EVERY 24 HOURS
Status: DISCONTINUED | OUTPATIENT
Start: 2025-01-15 | End: 2025-01-16

## 2025-01-14 RX ORDER — MAGNESIUM SULFATE HEPTAHYDRATE 40 MG/ML
2 INJECTION, SOLUTION INTRAVENOUS ONCE
Status: COMPLETED | OUTPATIENT
Start: 2025-01-14 | End: 2025-01-14

## 2025-01-14 RX ORDER — BUPROPION HYDROCHLORIDE 150 MG/1
150 TABLET ORAL EVERY MORNING
Status: DISCONTINUED | OUTPATIENT
Start: 2025-01-15 | End: 2025-01-20 | Stop reason: HOSPADM

## 2025-01-14 RX ORDER — ALBUTEROL SULFATE 90 UG/1
1 INHALANT RESPIRATORY (INHALATION) EVERY 4 HOURS PRN
COMMUNITY
Start: 2024-06-20

## 2025-01-14 RX ORDER — VIT A/VIT C/VIT E/ZINC/COPPER 4296-226
1 CAPSULE ORAL EVERY MORNING
COMMUNITY

## 2025-01-14 RX ORDER — PSEUDOEPHED/ACETAMINOPH/DIPHEN 30MG-500MG
1000 TABLET ORAL 3 TIMES DAILY PRN
COMMUNITY

## 2025-01-14 RX ORDER — METHYLPREDNISOLONE SODIUM SUCCINATE 125 MG/2ML
60 INJECTION INTRAMUSCULAR; INTRAVENOUS EVERY 24 HOURS
Status: DISCONTINUED | OUTPATIENT
Start: 2025-01-14 | End: 2025-01-15

## 2025-01-14 RX ORDER — CALCIUM CARBONATE 500 MG/1
1000 TABLET, CHEWABLE ORAL 4 TIMES DAILY PRN
Status: DISCONTINUED | OUTPATIENT
Start: 2025-01-14 | End: 2025-01-20 | Stop reason: HOSPADM

## 2025-01-14 RX ORDER — IPRATROPIUM BROMIDE AND ALBUTEROL SULFATE 2.5; .5 MG/3ML; MG/3ML
3 SOLUTION RESPIRATORY (INHALATION)
Status: DISCONTINUED | OUTPATIENT
Start: 2025-01-14 | End: 2025-01-20 | Stop reason: HOSPADM

## 2025-01-14 RX ADMIN — CEFTRIAXONE SODIUM 2 G: 2 INJECTION, POWDER, FOR SOLUTION INTRAMUSCULAR; INTRAVENOUS at 19:30

## 2025-01-14 RX ADMIN — AZITHROMYCIN MONOHYDRATE 500 MG: 500 INJECTION, POWDER, LYOPHILIZED, FOR SOLUTION INTRAVENOUS at 20:32

## 2025-01-14 RX ADMIN — IPRATROPIUM BROMIDE AND ALBUTEROL SULFATE 3 ML: .5; 3 SOLUTION RESPIRATORY (INHALATION) at 23:14

## 2025-01-14 RX ADMIN — MAGNESIUM SULFATE HEPTAHYDRATE 2 G: 40 INJECTION, SOLUTION INTRAVENOUS at 21:45

## 2025-01-14 RX ADMIN — ENOXAPARIN SODIUM 40 MG: 40 INJECTION SUBCUTANEOUS at 21:45

## 2025-01-14 RX ADMIN — METHYLPREDNISOLONE SODIUM SUCCINATE 62.5 MG: 125 INJECTION, POWDER, FOR SOLUTION INTRAMUSCULAR; INTRAVENOUS at 21:45

## 2025-01-14 RX ADMIN — SODIUM CHLORIDE 500 ML: 9 INJECTION, SOLUTION INTRAVENOUS at 19:30

## 2025-01-14 RX ADMIN — IOPAMIDOL 75 ML: 755 INJECTION, SOLUTION INTRAVENOUS at 17:07

## 2025-01-14 ASSESSMENT — COLUMBIA-SUICIDE SEVERITY RATING SCALE - C-SSRS
6. HAVE YOU EVER DONE ANYTHING, STARTED TO DO ANYTHING, OR PREPARED TO DO ANYTHING TO END YOUR LIFE?: NO
1. IN THE PAST MONTH, HAVE YOU WISHED YOU WERE DEAD OR WISHED YOU COULD GO TO SLEEP AND NOT WAKE UP?: NO
2. HAVE YOU ACTUALLY HAD ANY THOUGHTS OF KILLING YOURSELF IN THE PAST MONTH?: NO

## 2025-01-14 ASSESSMENT — ACTIVITIES OF DAILY LIVING (ADL)
ADLS_ACUITY_SCORE: 41

## 2025-01-14 ASSESSMENT — ENCOUNTER SYMPTOMS
SHORTNESS OF BREATH: 1
COUGH: 1

## 2025-01-14 NOTE — ED PROVIDER NOTES
Department of Emergency Medicine  FIRST PROVIDER TRIAGE NOTE             Independent MLP           11/10/22  1:18 PM EST    Date of Encounter: 11/10/22   MRN: [id number]      HPI: Syed Gonzáles is a 52 y.o. male who presents to the ED for Other (Pt had testicle surgery last Friday and has been getting Percocet and Tylenol 3 and needs stronger pain pill. Pt called his Dr yesterday and was given the Tylenol 3)     Patient is a 68-year-old that recently had surgery on his left testicle and states that he is in extreme bout of pain is not getting better. Patient called his doctor told him to come in for evaluation    ROS: Negative for cp, sob, abd pain, or back pain. PE: Gen Appearance/Constitutional: alert  HEENT: NC/NT. PERRLA,  Airway patent. Neck: supple     Initial Plan of Care: All treatment areas with department are currently occupied. Plan to order/Initiate the following while awaiting opening in ED: labs.   Initiate Treatment-Testing, Proceed toTreatment Area When Bed Available for ED Attending/MLP to Continue Care    Electronically signed by Narsa Lambert PA-C   DD: 11/10/22       Nasra Lambert PA-C  11/10/22 7129 EMERGENCY DEPARTMENT ENCOUNTER      NAME: Stephanie Acuna  AGE: 74 year old female  YOB: 1950  MRN: [id number]  EVALUATION DATE & TIME: 1/14/2025  3:43 PM    PCP: Clara Franz    ED PROVIDER: Adal Beyer M.D.      Chief Complaint   Patient presents with    Shortness of Breath         FINAL IMPRESSION:  1.  Acute dyspnea.  2.  Acute hypoxia.  3.  Chronic bronchiectasis, suspect acute pneumonia.  4.  Acute renal failure.    ED COURSE & MEDICAL DECISION MAKING:    3:55 PM I met with the patient to gather history and to perform my initial exam. We discussed plans for the ED course, including diagnostic testing and treatment. PPE worn: cloth mask.  Patient is short of breath since yesterday.  Nonproductive cough.  EMS noted saturation room air in the mid 80s.  Currently on 2 L nasal cannula above 90.  Confirmed underlying DNR/DNI status with family.  7:08 PM.  I suspect the patient may have an underlying pneumonia.  Given the fact she has bronchiectasis this infection can often hide there and not be seen on imaging.  With patient hypoxia, and increasing shortness of breath as well as acute renal failure plan to admit her to [facility] inpatient.  Will page admitting service.  Patient family in agreement.  We did order double antibiotics.  7:20 PM.  Spoke with family practice resident who accepted patient to [facility] inpatient bed.    Pertinent Labs & Imaging studies reviewed. (See chart for details)  74 year old female presents to the Emergency Department for evaluation of shortness of breath.    At the conclusion of the encounter I discussed the results of all of the tests and the disposition. The questions were answered. The patient or family acknowledged understanding and was agreeable with the care plan.              Medical Decision Making  Obtained supplemental history:Supplemental history obtained?: Documented in chart  Reviewed external records: External records reviewed?: Documented in chart,  Inpatient Record:  , and Outpatient Record:    Care impacted by chronic illness:Chronic Lung Disease  Did you consider but not order tests?: Work up considered but not performed and documented in chart, if applicable  Did you interpret images independently?: Independent interpretation of ECG and images noted in documentation, when applicable.  Consultation discussion with other provider: Will discuss with admitting service.  Probable admission given hypoxia.    MIPS: CT Pulmonary Angiogram:CT PA was ordered for reason(s) other than PE.        MEDICATIONS GIVEN IN THE EMERGENCY:  Medications - No data to display    NEW PRESCRIPTIONS STARTED AT TODAY'S ER VISIT  New Prescriptions    No medications on file          =================================================================    HPI    Patient information was obtained from: patient    Use of : N/A         Stephanie Acuna is a 74 year old female with a pertinent history of asthma, bronchiectasis, chronic sinusitis who presents to this ED for evaluation of shortness of breath.     Patient arrives from a nursing home. She is DNR, DNI. Patient endorses shortness of breath yesterday that increases with activity and a nonproductive cough.     She does not identify any waxing or waning symptoms otherwise, exacerbating or alleviating features, associated symptoms except as mentioned. She denies any pain related complaints.    REVIEW OF SYSTEMS   Review of Systems   Respiratory:  Positive for cough and shortness of breath.    All other systems reviewed and are negative.       PAST MEDICAL HISTORY:  No past medical history on file.    PAST SURGICAL HISTORY:  Past Surgical History:   Procedure Laterality Date    HYSTERECTOMY  Early 1990's    OOPHORECTOMY Bilateral Early 1990's           CURRENT MEDICATIONS:    cephALEXin (KEFLEX) 250 MG capsule        ALLERGIES:  Allergies   Allergen Reactions    Amlodipine Swelling    Escitalopram Other (See Comments)     Other  Reaction(s): Insomnia/personailty changes    And personality changes    Loratadine      Other Reaction(s): Very dry    Sulfa Antibiotics Unknown       FAMILY HISTORY:  Family History   Problem Relation Age of Onset    Ovarian Cancer Maternal Grandmother        SOCIAL HISTORY:   Social History     Socioeconomic History    Marital status: Single     Social Drivers of Health     Food Insecurity: No Food Insecurity (7/4/2024)    Received from [facility]    Hunger Vital Sign     Worried About Running Out of Food in the Last Year: Never true     Ran Out of Food in the Last Year: Never true   Transportation Needs: No Transportation Needs (7/4/2024)    Received from [facility]    PRAPARE - Transportation     Lack of Transportation (Medical): No     Lack of Transportation (Non-Medical): No   Interpersonal Safety: Not At Risk (7/4/2024)    Received from [facility]    EH IP Custom IPV     Do you feel UNSAFE in any of your personal relationships with your family members or any other acquaintances?: No   Housing Stability: Low Risk  (7/4/2024)    Received from [facility]    Housing Stability Vital Sign     Unable to Pay for Housing in the Last Year: No     Number of Times Moved in the Last Year: 0     Homeless in the Last Year: No     No current drugs, alcohol, or tobacco.    VITALS:  BP (!) 144/74   Pulse 80   Temp 98.4  F (36.9  C) (Oral)   Resp 30   Wt 96.2 kg (212 lb)   SpO2 100%   BMI 30.42 kg/m      PHYSICAL EXAM    Vital Signs:  BP (!) 144/74   Pulse 80   Temp 98.4  F (36.9  C) (Oral)   Resp 30   Wt 96.2 kg (212 lb)   SpO2 100%   BMI 30.42 kg/m    General:  On entering the room she is in no apparent distress.    Neck:  Neck supple with full range of motion and nontender.    Back:  Back and spine are nontender.  No costovertebral angle tenderness.    HEENT:  Oropharynx  clear with moist mucous membranes.  HEENT unremarkable.    Pulmonary:  Chest clear to auscultation without rhonchi rales or wheezing.  Tachypnea.  Cardiovascular:  Cardiac regular rate and rhythm without murmurs rubs or gallops.    Abdomen:  Abdomen soft nontender.  There is no rebound or guarding.    Muskuloskeletal:  She moves all 4 without any difficulty and has normal neurovascular exams.  Extremities without clubbing, cyanosis, or edema.  Legs and calves are nontender.    Neuro:  She is alert and oriented ×3 and moves all extremities symmetrically.    Psych:  Normal affect.    Skin:  Unremarkable and warm and dry.       LAB:  All pertinent labs reviewed and interpreted.  Labs Ordered and Resulted from Time of ED Arrival to Time of ED Departure   BASIC METABOLIC PANEL - Abnormal       Result Value    Sodium 139      Potassium 4.1      Chloride 100      Carbon Dioxide (CO2) 25      Anion Gap 14      Urea Nitrogen 35.7 (*)     Creatinine 1.49 (*)     GFR Estimate 36 (*)     Calcium 9.0      Glucose 113 (*)    TROPONIN T, HIGH SENSITIVITY - Abnormal    Troponin T, High Sensitivity 31 (*)    CBC WITH PLATELETS AND DIFFERENTIAL - Abnormal    WBC Count 8.4      RBC Count 3.66 (*)     Hemoglobin 11.8      Hematocrit 35.5      MCV 97      MCH 32.2      MCHC 33.2      RDW 13.3      Platelet Count 291      % Neutrophils 64      % Lymphocytes 17      % Monocytes 8      % Eosinophils 10      % Basophils 1      % Immature Granulocytes 1      NRBCs per 100 WBC 0      Absolute Neutrophils 5.4      Absolute Lymphocytes 1.4      Absolute Monocytes 0.7      Absolute Eosinophils 0.9 (*)     Absolute Basophils 0.1      Absolute Immature Granulocytes 0.0      Absolute NRBCs 0.0     TROPONIN T, HIGH SENSITIVITY - Abnormal    Troponin T, High Sensitivity 30 (*)    NT PROBNP INPATIENT - Normal    N terminal Pro BNP Inpatient 465     INFLUENZA A/B, RSV AND SARS-COV2 PCR - Normal    Influenza A PCR Negative      Influenza B PCR Negative       RSV PCR Negative      SARS CoV2 PCR Negative         RADIOLOGY:  Reviewed all pertinent imaging. Please see official radiology report.  CT Chest Pulmonary Embolism w Contrast   Final Result   IMPRESSION:   1.  No pulmonary embolism.      2.  Mild bronchiectasis in the lungs with bronchiolar wall thickening, predominantly in the mid and lower lungs, and regions of mucous plugging in the lung bases.                 EKG:    Normal sinus rhythm 83, premature H contractions, negative EKG.  No previous for comparison.    I have independently reviewed and interpreted the EKG(s) documented above.    PROCEDURES:         I, Isabel Larson, am serving as a scribe to document services personally performed by Dr. Beyer based on my observation and the provider's statements to me. I, Adal Beyer MD attest that Isabel Larson is acting in a scribe capacity, has observed my performance of the services and has documented them in accordance with my direction.    Adal Beyer M.D.  Emergency Medicine  [facility] EMERGENCY ROOM  [address] [place] [address] [id number] [phone]  Dept: [phone]       Adal Beyer MD  01/14/25 1909       Adal Beyer MD  01/14/25 1920

## 2025-01-14 NOTE — ED TRIAGE NOTES
Pt arrives via [facility] from [facility]. EMS reports cough x 2 days. CXR at [facility] yesterday was normal. Worsening sob today., Sat was 87% room air. EMS gave neb PTA. Sat 97% 2 liters upon arrival. Pt is alert to self only. Afebrile 98.4.     Noted DNR.      Triage Assessment (Adult)       Row Name 01/14/25 1553          Triage Assessment    Airway WDL WDL        Respiratory WDL    Respiratory WDL cough;all     Rhythm/Pattern, Respiratory shortness of breath     Cough Type productive;congested        Cardiac WDL    Cardiac WDL WDL        Peripheral/Neurovascular WDL    Peripheral Neurovascular WDL WDL        Cognitive/Neuro/Behavioral WDL    Cognitive/Neuro/Behavioral WDL orientation     Orientation disoriented to;situation;time;place

## 2025-01-14 NOTE — ED NOTES
Bed: [facility]ED-17  Expected date:   Expected time:   Means of arrival:   Comments:  EMS when bed is back

## 2025-01-15 LAB
ANION GAP SERPL CALCULATED.3IONS-SCNC: 14 MMOL/L (ref 7–15)
BASE EXCESS BLDV CALC-SCNC: 4.4 MMOL/L (ref -3–3)
BUN SERPL-MCNC: 27.9 MG/DL (ref 8–23)
CALCIUM SERPL-MCNC: 9.3 MG/DL (ref 8.8–10.4)
CHLORIDE SERPL-SCNC: 101 MMOL/L (ref 98–107)
CREAT SERPL-MCNC: 0.97 MG/DL (ref 0.51–0.95)
EGFRCR SERPLBLD CKD-EPI 2021: 60 ML/MIN/1.73M2
ERYTHROCYTE [DISTWIDTH] IN BLOOD BY AUTOMATED COUNT: 13.3 % (ref 10–15)
GLUCOSE SERPL-MCNC: 165 MG/DL (ref 70–99)
HCO3 BLDV-SCNC: 28 MMOL/L (ref 21–28)
HCO3 SERPL-SCNC: 22 MMOL/L (ref 22–29)
HCT VFR BLD AUTO: 32.2 % (ref 35–47)
HGB BLD-MCNC: 11.1 G/DL (ref 11.7–15.7)
MCH RBC QN AUTO: 32.3 PG (ref 26.5–33)
MCHC RBC AUTO-ENTMCNC: 34.5 G/DL (ref 31.5–36.5)
MCV RBC AUTO: 94 FL (ref 78–100)
O2/TOTAL GAS SETTING VFR VENT: 21 %
OXYHGB MFR BLDV: 75 % (ref 70–75)
PCO2 BLDV: 38 MM HG (ref 40–50)
PH BLDV: 7.48 [PH] (ref 7.32–7.43)
PLATELET # BLD AUTO: 291 10E3/UL (ref 150–450)
PO2 BLDV: 40 MM HG (ref 25–47)
POTASSIUM SERPL-SCNC: 4.2 MMOL/L (ref 3.4–5.3)
RBC # BLD AUTO: 3.44 10E6/UL (ref 3.8–5.2)
SAO2 % BLDV: 76.8 % (ref 70–75)
SODIUM SERPL-SCNC: 137 MMOL/L (ref 135–145)
WBC # BLD AUTO: 6.8 10E3/UL (ref 4–11)

## 2025-01-15 PROCEDURE — 85014 HEMATOCRIT: CPT

## 2025-01-15 PROCEDURE — 250N000013 HC RX MED GY IP 250 OP 250 PS 637: Performed by: DIETITIAN, REGISTERED

## 2025-01-15 PROCEDURE — 94640 AIRWAY INHALATION TREATMENT: CPT | Mod: 76

## 2025-01-15 PROCEDURE — 82805 BLOOD GASES W/O2 SATURATION: CPT | Performed by: DIETITIAN, REGISTERED

## 2025-01-15 PROCEDURE — 80048 BASIC METABOLIC PNL TOTAL CA: CPT

## 2025-01-15 PROCEDURE — 94640 AIRWAY INHALATION TREATMENT: CPT

## 2025-01-15 PROCEDURE — 85048 AUTOMATED LEUKOCYTE COUNT: CPT

## 2025-01-15 PROCEDURE — 36415 COLL VENOUS BLD VENIPUNCTURE: CPT | Performed by: DIETITIAN, REGISTERED

## 2025-01-15 PROCEDURE — 999N000157 HC STATISTIC RCP TIME EA 10 MIN

## 2025-01-15 PROCEDURE — 36415 COLL VENOUS BLD VENIPUNCTURE: CPT

## 2025-01-15 PROCEDURE — 250N000009 HC RX 250

## 2025-01-15 PROCEDURE — 250N000011 HC RX IP 250 OP 636

## 2025-01-15 PROCEDURE — 82374 ASSAY BLOOD CARBON DIOXIDE: CPT

## 2025-01-15 PROCEDURE — 99222 1ST HOSP IP/OBS MODERATE 55: CPT | Mod: AI

## 2025-01-15 PROCEDURE — 250N000013 HC RX MED GY IP 250 OP 250 PS 637

## 2025-01-15 PROCEDURE — 250N000012 HC RX MED GY IP 250 OP 636 PS 637: Performed by: DIETITIAN, REGISTERED

## 2025-01-15 PROCEDURE — 120N000001 HC R&B MED SURG/OB

## 2025-01-15 RX ORDER — PREDNISONE 20 MG/1
60 TABLET ORAL DAILY
Status: COMPLETED | OUTPATIENT
Start: 2025-01-15 | End: 2025-01-18

## 2025-01-15 RX ORDER — AZITHROMYCIN 250 MG/1
250 TABLET, FILM COATED ORAL DAILY
Status: COMPLETED | OUTPATIENT
Start: 2025-01-15 | End: 2025-01-19

## 2025-01-15 RX ADMIN — IPRATROPIUM BROMIDE AND ALBUTEROL SULFATE 3 ML: .5; 3 SOLUTION RESPIRATORY (INHALATION) at 15:56

## 2025-01-15 RX ADMIN — AZITHROMYCIN DIHYDRATE 250 MG: 250 TABLET, FILM COATED ORAL at 13:30

## 2025-01-15 RX ADMIN — HYDROCHLOROTHIAZIDE 50 MG: 25 TABLET ORAL at 08:01

## 2025-01-15 RX ADMIN — CETIRIZINE HYDROCHLORIDE 10 MG: 10 TABLET, FILM COATED ORAL at 08:01

## 2025-01-15 RX ADMIN — CEFTRIAXONE SODIUM 2 G: 2 INJECTION, POWDER, FOR SOLUTION INTRAMUSCULAR; INTRAVENOUS at 19:55

## 2025-01-15 RX ADMIN — IPRATROPIUM BROMIDE AND ALBUTEROL SULFATE 3 ML: .5; 3 SOLUTION RESPIRATORY (INHALATION) at 12:10

## 2025-01-15 RX ADMIN — BUPROPION HYDROCHLORIDE 150 MG: 150 TABLET, EXTENDED RELEASE ORAL at 08:01

## 2025-01-15 RX ADMIN — LOSARTAN POTASSIUM 75 MG: 50 TABLET, FILM COATED ORAL at 08:01

## 2025-01-15 RX ADMIN — FLUTICASONE FUROATE AND VILANTEROL TRIFENATATE 1 PUFF: 100; 25 POWDER RESPIRATORY (INHALATION) at 09:56

## 2025-01-15 RX ADMIN — PREDNISONE 60 MG: 20 TABLET ORAL at 13:30

## 2025-01-15 RX ADMIN — IPRATROPIUM BROMIDE AND ALBUTEROL SULFATE 3 ML: .5; 3 SOLUTION RESPIRATORY (INHALATION) at 08:45

## 2025-01-15 RX ADMIN — IPRATROPIUM BROMIDE AND ALBUTEROL SULFATE 3 ML: .5; 3 SOLUTION RESPIRATORY (INHALATION) at 19:31

## 2025-01-15 RX ADMIN — ENOXAPARIN SODIUM 40 MG: 40 INJECTION SUBCUTANEOUS at 19:58

## 2025-01-15 ASSESSMENT — ACTIVITIES OF DAILY LIVING (ADL)
ADLS_ACUITY_SCORE: 53
ADLS_ACUITY_SCORE: 43
ADLS_ACUITY_SCORE: 53
ADLS_ACUITY_SCORE: 56
ADLS_ACUITY_SCORE: 54
ADLS_ACUITY_SCORE: 43
ADLS_ACUITY_SCORE: 43
ADLS_ACUITY_SCORE: 54
ADLS_ACUITY_SCORE: 53
ADLS_ACUITY_SCORE: 53
ADLS_ACUITY_SCORE: 54
ADLS_ACUITY_SCORE: 43
ADLS_ACUITY_SCORE: 43
ADLS_ACUITY_SCORE: 53
ADLS_ACUITY_SCORE: 43

## 2025-01-15 NOTE — PLAN OF CARE
Patient oriented to self which is baseline. Denies pain. Slept most of night shift besides interruptions for cares. Expiratory wheezing and frequent congested cough. Satting at 94% RA. Unsure what pt ambulatory status baseline. Has not been OOB since arriving to ED. Turns and shifting utilized.         Problem: Adult Inpatient Plan of Care  Goal: Optimal Comfort and Wellbeing  Outcome: Progressing   Goal Outcome Evaluation:

## 2025-01-15 NOTE — PROGRESS NOTES
[facility]    Progress Note - Hospitalist Service       Date of Admission:  1/14/2025    Assessment & Plan   Stephanie Acuna is a 74 year old female admitted on 1/14/2025. She she has a history of chronic bronchiectasis, asthma, allergies, hypertension, DVT, and dementia, and is admitted for AHRF 2/2 asthma and bronchiectasis exacerbations.     Acute hypoxic respiratory failure secondary to asthma exacerbation  Chronic bronchiectasis exacerbation  Patient has diagnosis of asthma given an IgE level greater than 400 as well as peripheral eosinophilia seen back in 2018 (no formal PFTs). This has typically been well-controlled with Symbicort.  She also has chronic bronchiectasis that has had flareups requiring steroids in the past. Treating current symptoms as a chronic bronchiectasis flare. Normal BNP and WBC on admission. Viral panel negative. CT findings with bronchiectasis throughout and mucous plugging. CT PE negative. S/p 2 mg magnesium IV infusion x1 and 500 mg azithromycin IV loading dose on 1/14. VBG with pH 7.48, CO2 38. Improving clinically today.  - Ceftriaxone 1mg q24 hrs (1/14- )   - Switch from IV to PO Azithromycin 250 mg daily x5 days (1/14-1/18)  - Switch from IV to PO 60 mg prednisone x5 days (1/14-1/18)  - DuoNebs every 4 hours per RT  - Continue home PTA symbicort      ALLIE  Patient presented with creatinine of 1.49 baseline appears to be 0.8-0.9.  S/p 500 mL bolus in the ED with improvement.  -AM BMP     Allergies  - PTA daily cetirizine 10 mg     Hypertension  - PTA hydrochlorothiazide 25 mg daily  - PTA losartan 50 mg daily     Anxiety  - PTA bupropion 150 mg daily     Dementia  - Hold PTA donepezil (concern for worsened asthma symptoms)           Diet: Combination Diet Regular Diet Adult    DVT Prophylaxis: Enoxaparin (Lovenox) SQ  Rubio Catheter: Not present  Fluids: PO  Lines: None     Cardiac Monitoring: None  Code Status: No CPR- Do NOT Intubate      Clinically  "Significant Risk Factors Present on Admission                   # Hypertension: Home medication list includes antihypertensive(s)           # Obesity: Estimated body mass index is 30.42 kg/m  as calculated from the following:    Height as of this encounter: 1.778 m (5' 10\").    Weight as of this encounter: 96.2 kg (212 lb).       # Asthma: noted on problem list        Social Drivers of Health   Tobacco Use: Medium Risk (7/4/2024)    Received from [facility] and [facility]    Patient History     Smoking Tobacco Use: Former     Smokeless Tobacco Use: Never     Passive Exposure: Past         Disposition Plan     Medically Ready for Discharge: Anticipated Tomorrow       The patient's care was discussed with the Attending Physician, Dr. Swain .    Letty Swain MD  Hospitalist Service  [facility]  Securely message with [employer] (more info)  Text page via [employer] Paging/Directory   ______________________________________________________________________    Interval History   NAEO. Patient is comfortable in bed, sitting up after breakfast. Limited responses given severe dementia, so difficult to obtain information but she is not complaining of any chest pain or pain elsewhere. After telling her we are helping with her breathing, I asked what her main concern is and she repeated \"breathing.\"    Physical Exam   Vital Signs: Temp: 98.2  F (36.8  C) Temp src: Oral BP: 136/68 Pulse: 71   Resp: 18 SpO2: 95 % O2 Device: None (Room air) Oxygen Delivery: 2 LPM  Weight: 212 lbs 0 oz    GENERAL: Awake, alert, No acute distress.   HEENT: No scleral icterus or conjunctival injection.  SKIN: Warm and dry. No bruises, rashes, or skin lesions.  LUNGS: Normal work of breathing with no use of accessory muscles. Profound expiratory wheezing diffusely lung exam. No crackles appreciated.   CARDIAC: RRR. Normal S1 and S2. No murmurs, clicks, or rubs appreciated. No peripheral edema.  ABDOMEN: " Non-distended. Soft and non-tender throughout with no masses or organomegaly.  NEUROLOGIC: Alert and at baseline mentation.    EXTREMITIES: No gross deformity, trace peripheral edema. Appear well-perfused.       Data     I have personally reviewed the following data over the past 24 hrs:    6.8  \   11.1 (L)   / 291     137 101 27.9 (H) /  165 (H)   4.2 22 0.97 (H) \     Trop: 30 (H) BNP: 465       Imaging results reviewed over the past 24 hrs:   Recent Results (from the past 24 hours)   CT Chest Pulmonary Embolism w Contrast    Narrative    EXAM: CT CHEST PULMONARY EMBOLISM W CONTRAST  LOCATION: [facility]  DATE: 01/14/2025    INDICATION: SOB, cough, negative CXR.  COMPARISON: None.  TECHNIQUE: CT chest pulmonary angiogram during arterial phase injection of IV contrast. Multiplanar reformats and MIP reconstructions were performed. Dose reduction techniques were used.   CONTRAST: Isovue 370, 75 mL.    FINDINGS:  ANGIOGRAM CHEST: Pulmonary arteries are normal caliber and negative for pulmonary emboli. Thoracic aorta is negative for dissection. No CT evidence of right heart strain.    LUNGS AND PLEURA: Bronchiolar wall thickening in the lung bases with mild regions of bronchiectasis in the lungs. Segmental elevation of the right hemidiaphragm. There is compressive consolidation in the right middle and lower lobes. Very minimal patchy   infiltrate within the lingula. Regions of mucous plugging in the lung bases.    MEDIASTINUM/AXILLAE: Normal.    CORONARY ARTERY CALCIFICATION: Moderate.    UPPER ABDOMEN: Normal.    MUSCULOSKELETAL: Mild wedge deformity of the midthoracic vertebral body.      Impression    IMPRESSION:  1.  No pulmonary embolism.    2.  Mild bronchiectasis in the lungs with bronchiolar wall thickening, predominantly in the mid and lower lungs, and regions of mucous plugging in the lung bases.

## 2025-01-15 NOTE — MEDICATION SCRIBE - ADMISSION MEDICATION HISTORY
Medication Scribe Admission Medication History    Admission medication history is complete. The information provided in this note is only as accurate as the sources available at the time of the update.    Information Source(s): Facility (U/NH/) medication list/MAR and CareEverywhere/SureScripts via in-person    Pertinent Information: MAR printed from the [facility] 01/14/25 at 1508. PTA list adjusted accordingly.     FYI: recently was on cephalexin in December ending on approximately 12/21/2024.     Changes made to PTA medication list:  Added:   Acetaminophen 500 mg  Albuterol HFA 90 mcg/act  Bupropion  mg  Camphor-menthol 0.5-0.5% lotion  Cetirizine 10 mg  Donepezil 10 mg  Multivitamin   Preservision   Symbicort 160-4.5 mcg/act inhaler  Deleted:   Cephalexin 250 mg - completed  Changed: None    Allergies reviewed with patient and updates made in EHR: yes    Medication History Completed By: Chester Godwin 1/14/2025 7:31 PM    PTA Med List   Medication Sig Last Dose/Taking    acetaminophen (TYLENOL) 500 MG tablet Take 1,000 mg by mouth 3 times daily as needed for pain. 1/13/2025 at 11:52 AM    albuterol (PROAIR HFA/PROVENTIL HFA/VENTOLIN HFA) 108 (90 Base) MCG/ACT inhaler Inhale 1 puff into the lungs every 4 hours as needed for shortness of breath or wheezing. 1/14/2025 at  3:04 PM    buPROPion (WELLBUTRIN XL) 150 MG 24 hr tablet Take 150 mg by mouth every morning. 1/14/2025 at  7:40 AM    camphor-menthol (DERMASARRA) 0.5-0.5 % external lotion Apply topically every morning. Apply to back externally in the morning and also as needed for itching. 1/14/2025 at  7:40 AM    cetirizine (ZYRTEC) 10 MG tablet Take 1 tablet by mouth every morning. 1/14/2025 at  7:40 AM    donepezil (ARICEPT) 10 MG tablet Take 10 mg by mouth at bedtime. 1/13/2025 at  7:35 PM    hydrochlorothiazide (HYDRODIURIL) 25 MG tablet Take 50 mg by mouth every morning. 1/14/2025 at  7:40 AM    losartan (COZAAR) 50 MG tablet Take 1.5  tablets by mouth every morning. 1/14/2025 at  7:40 AM    Multiple Vitamins-Minerals (CENTRUM SILVER ADULT 50+) TABS Take 1 tablet by mouth every morning. 1/14/2025 at  7:40 AM    Multiple Vitamins-Minerals (PRESERVISION AREDS) CAPS Take 1 capsule by mouth every morning. 1/14/2025 at  7:40 AM    SYMBICORT 160-4.5 MCG/ACT Inhaler Inhale 2 puffs into the lungs 2 times daily. 1/14/2025 at  7:40 AM

## 2025-01-15 NOTE — H&P
[facility]    History and Physical - Hospitalist Service       Date of Admission:  1/14/2025    Assessment & Plan      Stephanie Acuna is a 74 year old female admitted on 1/14/2025. She she has a history of chronic bronchiectasis, asthma, allergies, hypertension, dementia    Acute hypoxic respiratory failure secondary to asthma exacerbation  Chronic bronchiectasis exacerbation  Patient has diagnosis of asthma given an IgE level greater than 400 as well as peripheral eosinophilia seen back in 2018.  This is typically been well-controlled with Symbicort.  She also has chronic bronchiectasis that has had flareups requiring steroids in the past.  Will treat as a chronic bronchitis flare. No documentation for formal PFT's although given the asthma history this could be transitioning into a more chronic obstructive pattern.  Patient does not have an elevated BNP at this time and white blood cell count within normal limits.  Viral panel negative.  CT findings with bronchiectasis throughout and mucous plugging.  CT PE negative.  -Ceftriaxone 1mg q24 hrs started 01/14/25   -Azithromycin 500 01/14/25 and 250 mg daily for 5 days total   -60 mg methylprednisolone IV 5-day course starting 01/14/25 -   -2 mg magnesium IV infusion once  -DuoNebs every 4 hours per RT  -Continue home PTA symbicort     ALLIE  Patient presented with creatinine of 1.49 baseline appears to be 0.8-0.9.  Given 500 mL bolus in the ED and will repeat BMP in the morning.  -AM BMP    Allergies  PTA daily cetirizine 10 mg    Hypertension  -PTA hydrochlorothiazide 25 mg daily  -PTA losartan 50 mg daily    Anxiety  -Space bupropion 150 mg daily    Dementia  -Hold PTA donepezil for this could worsen asthma symptoms        Diet:  Regular  DVT Prophylaxis: Enoxaparin (Lovenox) SQ  Rubio Catheter: Not present  Fluids: PO  Lines: None     Cardiac Monitoring: None  Code Status: No CPR- Do NOT Intubate      Clinically Significant Risk  Factors Present on Admission                                 # Asthma: noted on problem list        Disposition Plan        Erik Lara MD staffed with Bunny Holliday MD  Hospitalist Service  [facility]  Securely message with [employer] (more info)  Text page via [facility] Paging/Directory   ______________________________________________________________________    Chief Complaint   Shortness of breath    History of Present Illness   Stephanie Acuna is a 74 year old female with history of asthma, chronic bronchiectasis, chronic sinusitis presenting to the ED with shortness of breath. Patient has had flare ups of her cough in the past but never requiring hospitalization. She is typically well controlled with symbicort although has needed steroids in the past. Patient has profound dementia and history was gathered from her daughter.       Past Medical History    No past medical history on file.    Past Surgical History   Past Surgical History:   Procedure Laterality Date    HYSTERECTOMY  Early 1990's    OOPHORECTOMY Bilateral Early 1990's       Prior to Admission Medications   Prior to Admission Medications   Prescriptions Last Dose Informant Patient Reported? Taking?   cephALEXin (KEFLEX) 250 MG capsule   No No   Sig: Take 1 capsule (250 mg) by mouth 4 times daily      Facility-Administered Medications: None         Physical Exam   Vital Signs: Temp: 98.4  F (36.9  C) Temp src: Oral BP: 111/56 Pulse: 69   Resp: 29 SpO2: 97 % O2 Device: None (Room air) Oxygen Delivery: 2 LPM  Weight: 212 lbs 0 oz    GENERAL: Awake, alert, No acute distress.   HEENT: No scleral icterus or conjunctival injection.  SKIN: Warm and dry. No bruises, rashes, or skin lesions.  LUNGS: Normal work of breathing with no use of accessory muscles. Profound wheezing on anterior lung exam, wheezing heard on expiration without auscultation. No crackles appreciated.   CARDIAC: RRR. Normal S1 and S2. No murmurs, clicks, or rubs  appreciated.  No peripheral edema.  ABDOMEN: Non-distended. Soft and non-tender throughout with no masses or organomegaly.  NEUROLOGIC: Alert and at baseline mentation. Sensation to light touch involving upper and lower extremities intact bilaterally.   EXTREMITIES: No gross deformity or peripheral edema. Appear well-perfused.     Medical Decision Making       Data   Recent Labs   Lab 01/14/25  1611   WBC 8.4   HGB 11.8   MCV 97         POTASSIUM 4.1   CHLORIDE 100   CO2 25   BUN 35.7*   CR 1.49*   ANIONGAP 14   LIZETH 9.0   *     EXAM: CT CHEST PULMONARY EMBOLISM W CONTRAST  LOCATION: [facility]  DATE: 01/14/2025  IMPRESSION:  1.  No pulmonary embolism.  2.  Mild bronchiectasis in the lungs with bronchiolar wall thickening, predominantly in the mid and lower lungs, and regions of mucous plugging in the lung bases.

## 2025-01-16 ENCOUNTER — APPOINTMENT (OUTPATIENT)
Dept: OCCUPATIONAL THERAPY | Facility: CLINIC | Age: 75
DRG: 190 | End: 2025-01-16
Payer: COMMERCIAL

## 2025-01-16 LAB
ANION GAP SERPL CALCULATED.3IONS-SCNC: 14 MMOL/L (ref 7–15)
BUN SERPL-MCNC: 27.5 MG/DL (ref 8–23)
CALCIUM SERPL-MCNC: 9.6 MG/DL (ref 8.8–10.4)
CHLORIDE SERPL-SCNC: 102 MMOL/L (ref 98–107)
CREAT SERPL-MCNC: 0.9 MG/DL (ref 0.51–0.95)
CREAT SERPL-MCNC: 1.34 MG/DL (ref 0.51–0.95)
EGFRCR SERPLBLD CKD-EPI 2021: 41 ML/MIN/1.73M2
EGFRCR SERPLBLD CKD-EPI 2021: 66 ML/MIN/1.73M2
GLUCOSE SERPL-MCNC: 105 MG/DL (ref 70–99)
HCO3 SERPL-SCNC: 23 MMOL/L (ref 22–29)
HOLD SPECIMEN: NORMAL
POTASSIUM SERPL-SCNC: 3.9 MMOL/L (ref 3.4–5.3)
SODIUM SERPL-SCNC: 139 MMOL/L (ref 135–145)

## 2025-01-16 PROCEDURE — 94640 AIRWAY INHALATION TREATMENT: CPT

## 2025-01-16 PROCEDURE — 94640 AIRWAY INHALATION TREATMENT: CPT | Mod: 76

## 2025-01-16 PROCEDURE — 80048 BASIC METABOLIC PNL TOTAL CA: CPT | Performed by: DIETITIAN, REGISTERED

## 2025-01-16 PROCEDURE — 250N000013 HC RX MED GY IP 250 OP 250 PS 637

## 2025-01-16 PROCEDURE — 250N000009 HC RX 250

## 2025-01-16 PROCEDURE — 36415 COLL VENOUS BLD VENIPUNCTURE: CPT | Performed by: DIETITIAN, REGISTERED

## 2025-01-16 PROCEDURE — 250N000012 HC RX MED GY IP 250 OP 636 PS 637: Performed by: DIETITIAN, REGISTERED

## 2025-01-16 PROCEDURE — 999N000157 HC STATISTIC RCP TIME EA 10 MIN

## 2025-01-16 PROCEDURE — 120N000001 HC R&B MED SURG/OB

## 2025-01-16 PROCEDURE — 250N000013 HC RX MED GY IP 250 OP 250 PS 637: Performed by: DIETITIAN, REGISTERED

## 2025-01-16 PROCEDURE — 97165 OT EVAL LOW COMPLEX 30 MIN: CPT | Mod: GO | Performed by: OCCUPATIONAL THERAPIST

## 2025-01-16 PROCEDURE — 82310 ASSAY OF CALCIUM: CPT | Performed by: DIETITIAN, REGISTERED

## 2025-01-16 PROCEDURE — 82565 ASSAY OF CREATININE: CPT | Performed by: DIETITIAN, REGISTERED

## 2025-01-16 PROCEDURE — 97535 SELF CARE MNGMENT TRAINING: CPT | Mod: GO | Performed by: OCCUPATIONAL THERAPIST

## 2025-01-16 PROCEDURE — 250N000011 HC RX IP 250 OP 636

## 2025-01-16 PROCEDURE — 99232 SBSQ HOSP IP/OBS MODERATE 35: CPT | Mod: GC | Performed by: DIETITIAN, REGISTERED

## 2025-01-16 RX ORDER — DONEPEZIL HYDROCHLORIDE 10 MG/1
10 TABLET, FILM COATED ORAL AT BEDTIME
Status: DISCONTINUED | OUTPATIENT
Start: 2025-01-16 | End: 2025-01-20 | Stop reason: HOSPADM

## 2025-01-16 RX ADMIN — CETIRIZINE HYDROCHLORIDE 10 MG: 10 TABLET, FILM COATED ORAL at 08:34

## 2025-01-16 RX ADMIN — LOSARTAN POTASSIUM 75 MG: 50 TABLET, FILM COATED ORAL at 08:34

## 2025-01-16 RX ADMIN — ENOXAPARIN SODIUM 40 MG: 40 INJECTION SUBCUTANEOUS at 19:34

## 2025-01-16 RX ADMIN — IPRATROPIUM BROMIDE AND ALBUTEROL SULFATE 3 ML: .5; 3 SOLUTION RESPIRATORY (INHALATION) at 10:49

## 2025-01-16 RX ADMIN — FLUTICASONE FUROATE AND VILANTEROL TRIFENATATE 1 PUFF: 100; 25 POWDER RESPIRATORY (INHALATION) at 08:34

## 2025-01-16 RX ADMIN — AMOXICILLIN AND CLAVULANATE POTASSIUM 1 TABLET: 875; 125 TABLET, FILM COATED ORAL at 19:32

## 2025-01-16 RX ADMIN — IPRATROPIUM BROMIDE AND ALBUTEROL SULFATE 3 ML: .5; 3 SOLUTION RESPIRATORY (INHALATION) at 05:34

## 2025-01-16 RX ADMIN — PREDNISONE 60 MG: 20 TABLET ORAL at 08:34

## 2025-01-16 RX ADMIN — IPRATROPIUM BROMIDE AND ALBUTEROL SULFATE 3 ML: .5; 3 SOLUTION RESPIRATORY (INHALATION) at 14:37

## 2025-01-16 RX ADMIN — BUPROPION HYDROCHLORIDE 150 MG: 150 TABLET, EXTENDED RELEASE ORAL at 08:34

## 2025-01-16 RX ADMIN — IPRATROPIUM BROMIDE AND ALBUTEROL SULFATE 3 ML: .5; 3 SOLUTION RESPIRATORY (INHALATION) at 20:07

## 2025-01-16 RX ADMIN — DONEPEZIL HYDROCHLORIDE 10 MG: 10 TABLET ORAL at 22:06

## 2025-01-16 RX ADMIN — AZITHROMYCIN DIHYDRATE 250 MG: 250 TABLET, FILM COATED ORAL at 08:34

## 2025-01-16 RX ADMIN — HYDROCHLOROTHIAZIDE 50 MG: 25 TABLET ORAL at 08:34

## 2025-01-16 ASSESSMENT — ACTIVITIES OF DAILY LIVING (ADL)
ADLS_ACUITY_SCORE: 56
ADLS_ACUITY_SCORE: 64
NUMBER_OF_TIMES_PATIENT_HAS_FALLEN_WITHIN_LAST_SIX_MONTHS: 8
ADLS_ACUITY_SCORE: 60
ADLS_ACUITY_SCORE: 56
DIFFICULTY_EATING/SWALLOWING: NO
DIFFICULTY_COMMUNICATING: YES
ADLS_ACUITY_SCORE: 56
ADLS_ACUITY_SCORE: 65
ADLS_ACUITY_SCORE: 56
WALKING_OR_CLIMBING_STAIRS: STAIR CLIMBING DIFFICULTY, ASSISTANCE 1 PERSON;TRANSFERRING DIFFICULTY, ASSISTANCE 1 PERSON;AMBULATION DIFFICULTY, ASSISTANCE 1 PERSON
DRESSING/BATHING_DIFFICULTY: YES
ADLS_ACUITY_SCORE: 56
DIFFICULTY_COMMUNICATING: YES
TOILETING: 1-->ASSISTANCE (EQUIPMENT/PERSON) NEEDED
VISION_MANAGEMENT: GLASSES
ADLS_ACUITY_SCORE: 64
DRESSING/BATHING: BATHING DIFFICULTY, ASSISTANCE 1 PERSON;DRESSING DIFFICULTY, ASSISTANCE 1 PERSON
WEAR_GLASSES_OR_BLIND: YES
HEARING_DIFFICULTY_OR_DEAF: NO
ADLS_ACUITY_SCORE: 56
ADLS_ACUITY_SCORE: 56
FALL_HISTORY_WITHIN_LAST_SIX_MONTHS: YES
ADLS_ACUITY_SCORE: 60
CONCENTRATING,_REMEMBERING_OR_MAKING_DECISIONS_DIFFICULTY: YES
ADLS_ACUITY_SCORE: 56
DOING_ERRANDS_INDEPENDENTLY_DIFFICULTY: YES
TOILETING_ISSUES: YES
TOILETING: 1-->ASSISTANCE (EQUIPMENT/PERSON) NEEDED (NOT DEVELOPMENTALLY APPROPRIATE)
EQUIPMENT_CURRENTLY_USED_AT_HOME: OTHER (SEE COMMENTS)
ADLS_ACUITY_SCORE: 64
WALKING_OR_CLIMBING_STAIRS_DIFFICULTY: YES
CHANGE_IN_FUNCTIONAL_STATUS_SINCE_ONSET_OF_CURRENT_ILLNESS/INJURY: YES

## 2025-01-16 NOTE — PROGRESS NOTES
RESPIRATORY CARE NOTE     Patient Name: Stephanie Acuna  Today's Date: 1/16/2025       Pt continues to receive duo nebs QID per home regimen. BS are coarse, exp wheezes to exp wheezes and diminished. Pt is on 2 lpm  of oxygen via NC, SpO2 is 91-92%. Post treatment there is increased aeration.at times Pt also perceives improvement.  RT encouraged deep breathing and coughing techniques .  RT will continue to monitor and assess.    Alayna Ann, RT

## 2025-01-16 NOTE — PROGRESS NOTES
01/16/25 1404   Appointment Info   Signing Clinician's Name / Credentials (OT) Elizabeth Zepeda OTR   Rehab Comments (OT) OT TODD   Quick Adds   Quick Adds [facility] Auth & Certification   Living Environment   People in Home facility resident   Current Living Arrangements extended care facility   Home Accessibility no concerns   Transportation Anticipated health plan transportation;family or friend will provide   Living Environment Comments Pt lives in Memory Care.   Self-Care   Usual Activity Tolerance fair   Current Activity Tolerance poor   Equipment Currently Used at Home other (see comments);shower chair  (4WW)   Fall history within last six months yes   Number of times patient has fallen within last six months 8   Activity/Exercise/Self-Care Comment Pt receives assistance with all self-care, mobility. Frequently falls   General Information   Onset of Illness/Injury or Date of Surgery 01/14/25   Referring Physician Pool Swain   Patient/Family Therapy Goal Statement (OT) Family expresses desire for her to return to  when released.   Additional Occupational Profile Info/Pertinent History of Current Problem Stephanie Acuna is a 74 year old female admitted on 1/14/2025. She she has a history of chronic bronchiectasis, asthma, allergies, hypertension, DVT, and dementia, and is admitted for AHRF 2/2 asthma and bronchiectasis exacerbations.   Existing Precautions/Restrictions fall;oxygen therapy device and L/min  (2L/min)   Limitations/Impairments safety/cognitive   Cognitive Status Examination   Orientation Status person   Follows Commands delayed response/completion;physical/tactile prompts required;repetition of directions required   Cognitive Status Comments Pt with very limited communicaiton due to dementia. Follows simple commands; unsafe during transfers due to sitting before its safe   Pain Assessment   Patient Currently in Pain No   Transfers   Transfers sit-stand transfer;bed-chair transfer;toilet transfer    Transfer Comments Mod to max a   Activities of Daily Living   BADL Assessment/Intervention toileting;lower body dressing;bathing;upper body dressing   Bathing Assessment/Intervention   Comment, (Bathing) Recieves assistance at    Upper Body Dressing Assessment/Training   Comment, (Upper Body Dressing) Recieves assistance at    Lower Body Dressing Assessment/Training   Comment, (Lower Body Dressing) Recieves assistance at    Toileting   Comment, (Toileting) Recieves assistance at    Clinical Impression   Criteria for Skilled Therapeutic Interventions Met (OT) Yes, treatment indicated   OT Diagnosis Decreased indep with ADL   Influenced by the following impairments dementia, medical status   OT Problem List-Impairments impacting ADL problems related to;activity tolerance impaired;cognition;mobility   Assessment of Occupational Performance 1-3 Performance Deficits   Identified Performance Deficits mobility   Planned Therapy Interventions (OT) transfer training   Clinical Decision Making Complexity (OT) problem focused assessment/low complexity   Risk & Benefits of therapy have been explained evaluation/treatment results reviewed   OT Total Evaluation Time   OT Eval, Low Complexity Minutes (95745) 10   Therapy Certification   Medical Diagnosis Bronchiectasis   Start of Care Date 01/16/25   Certification date from 01/16/25   Certification date to 01/16/25   [facility] Authorization Information   Condition type Initial onset (within last 3 months)   Cause of current episode Unspecified   Nature of treatment Rehabilitative   Functional ability Moderate functional limitations   Documented POC (choose all that apply) Frequency of treatment visits and treatment activities to address deficit areas.;Measurable short and long term/discharge treatment goals related to physical and functional deficits.   Briefly describe symptoms SOB with activity   Symptom impact on ADLs A little bit   Condition change since eval N/A (first  visit)   OT Goals   Therapy Frequency (OT) One time eval and treatment   OT Predicted Duration/Target Date for Goal Attainment 01/16/25   OT Goals Toilet Transfer/Toileting   OT: Toilet Transfer/Toileting Moderate assist;toilet transfer;Goal Met   Interventions   Interventions Quick Adds Self-Care/Home Management   Self-Care/Home Management   Self-Care/Home Mgmt/ADL, Compensatory, Meal Prep Minutes (39501) 20   Symptoms Noted During/After Treatment (Meal Preparation/Planning Training) shortness of breath;significant change in vital signs;increase work of breath   Treatment Detail/Skilled Intervention Pt participated with OT session. Able to walk using RW with mod A STS; max assist to safety sit on surface (toilet, chair, etc) due to cognitive status. Per her daughter, this is her baseline and she has had frequent falls. Educated pt's daughter on fall prevention, however pt lives at a Memory Care facility. Pt at baseline for ADL functioning.  SOB with activity.   OT Discharge Planning   OT Plan DC OT   OT Discharge Recommendation (DC Rec) Long term care facility   OT Rationale for DC Rec Pt OK to return to Memory Care facility when medically released; they are able to assist her with ADL.   OT Brief overview of current status Requires assistance for ADL as per her baseline. Decreased activity tolerance due to medical status.   OT Total Distance Amb During Session (feet) 150   Total Session Time   Timed Code Treatment Minutes 20   Total Session Time (sum of timed and untimed services) 30   M [facility]                                                                                   OUTPATIENT OCCUPATIONAL THERAPY    PLAN OF TREATMENT FOR OUTPATIENT REHABILITATION   Patient's Last Name, First Name, Stephanie Roberts YOB: 1950   Provider's Name   [facility]   Medical Record No.  [id number]     Onset Date: 01/14/25 Start of Care Date:  01/16/25     Medical Diagnosis:  Bronchiectasis               OT Diagnosis:  Decreased indep with ADL Certification Dates:  From: 01/16/25  To: 01/16/25     See note for plan of treatment, functional goals, and certification details.    I CERTIFY THE NEED FOR THESE SERVICES FURNISHED UNDER        THIS PLAN OF TREATMENT AND WHILE UNDER MY CARE (Physician co-signature of this document indicates review and certification of the therapy plan).

## 2025-01-16 NOTE — PLAN OF CARE
Problem: Gas Exchange Impaired  Goal: Optimal Gas Exchange  Intervention: Optimize Oxygenation and Ventilation  Recent Flowsheet Documentation  Taken 1/15/2025 1900 by Katy Dumont RN  Head of Bed ([facility]) Positioning: HOB at 30-45 degrees  Taken 1/15/2025 1514 by Katy Dumont RN  Head of Bed ([facility]) Positioning: HOB at 60-90 degrees  Taken 1/15/2025 0750 by Katy Dumont RN  Head of Bed ([facility]) Positioning: HOB at 60-90 degrees     Problem: Adult Inpatient Plan of Care  Goal: Optimal Comfort and Wellbeing  Outcome: Progressing     Pt alert to self only. Pleasant with cares. VSS on RA. Denies pain. Tolerating po intake. Purwick in place with good output. Q 2 turns as tolerated. Scheduled nebs given with RT. Po abx given. Family at the bedside today. Discharge pending clinical progression.     Katy Dumont RN  [phone]

## 2025-01-16 NOTE — PROGRESS NOTES
[facility]    Progress Note - Hospitalist Service       Date of Admission:  1/14/2025    Assessment & Plan   Stephanie Acuna is a 74 year old female admitted on 1/14/2025. She she has a history of chronic bronchiectasis, asthma, allergies, hypertension, DVT, and dementia, and is admitted for AHRF 2/2 asthma and bronchiectasis exacerbations.     Acute hypoxic respiratory failure secondary to asthma exacerbation  Chronic bronchiectasis exacerbation  Patient has diagnosis of asthma given an IgE level greater than 400 as well as peripheral eosinophilia seen back in 2018 (no formal PFTs). This has typically been well-controlled with Symbicort.  She also has chronic bronchiectasis that has had flareups requiring steroids in the past. Treating current symptoms as a chronic bronchiectasis flare. Normal BNP and WBC on admission. Viral panel negative. CT findings with bronchiectasis throughout and mucous plugging. CT PE negative. S/p 2 mg magnesium IV infusion x1 and 500 mg azithromycin IV loading dose on 1/14. VBG with pH 7.48, CO2 38. Improving respiratory exam although remains short of breath with activity.  - Stop Ceftriaxone 1mg q24 hrs (1/14-1/15)   - Start Augmentin 875-125 mg BID x3 days (1/16-1/18)  - Azithromycin 250 mg PO daily x5 days (1/14-1/18)  - Prednisone 60 mg PO daily x5 days (1/14-1/18)  - DuoNebs every 4 hours per RT  - PTA symbicort   - PT/OT consult     ALLIE, resolved  Patient presented with creatinine of 1.49 baseline appears to be 0.8-0.9. Resolved with IVF.  -AM BMP     Allergies  - PTA daily cetirizine 10 mg     Hypertension  - PTA hydrochlorothiazide 25 mg daily  - PTA losartan 50 mg daily     Anxiety  - PTA bupropion 150 mg daily     Dementia  - Resume PTA donepezil         Diet: Combination Diet Regular Diet Adult    DVT Prophylaxis: Enoxaparin (Lovenox) SQ  Rubio Catheter: Not present  Fluids: PO  Lines: None     Cardiac Monitoring: None  Code Status: No CPR- Do  "NOT Intubate      Clinically Significant Risk Factors                              # Obesity: Estimated body mass index is 30.42 kg/m  as calculated from the following:    Height as of this encounter: 1.778 m (5' 10\").    Weight as of this encounter: 96.2 kg (212 lb)., PRESENT ON ADMISSION     # Asthma: noted on problem list        Social Drivers of Health   Tobacco Use: Medium Risk (7/4/2024)    Received from  and [facility]    Patient History     Smoking Tobacco Use: Former     Smokeless Tobacco Use: Never     Passive Exposure: Past         Disposition Plan     Medically Ready for Discharge: Anticipated Tomorrow       The patient's care was discussed with the Attending Physician, Dr. Swain .    Letty Swain MD  Hospitalist Service  [facility]  Securely message with [employer] (more info)  Text page via [employer] Paging/Directory   ______________________________________________________________________    Interval History   Coughing overnight. Given scheduled duoneb treatment. On supplemental O2 at 3.5L this morning.     Spoke with patient's daughter this afternoon. She feels her mom is still significantly more short of breath with activity than her baseline.  Also coughing a lot more than normal.  Supplemental oxygen is not baseline per her mom either.  She is concerned that if her mom went back to her [facility] care facility she would end up back in the emergency department at this point.    Physical Exam   Vital Signs: Temp: 98.5  F (36.9  C) Temp src: Oral BP: 136/69 Pulse: 68   Resp: 18 SpO2: 93 % O2 Device: Nasal cannula Oxygen Delivery: 2 LPM  Weight: 212 lbs 0 oz    GENERAL: Awake, alert, No acute distress.   HEENT: No scleral icterus or conjunctival injection.  SKIN: Warm and dry. No bruises, rashes, or skin lesions.  LUNGS: NC in place.  Normal work of breathing with no use of accessory muscles.  Improved air movement.  Expiratory wheezing diffusely on lung " exam but significantly improved from exam on 1/14. No crackles appreciated.   CARDIAC: RRR. Normal S1 and S2. No murmurs, clicks, or rubs appreciated. No peripheral edema.  ABDOMEN: Non-distended.   NEUROLOGIC: Alert.  A&O x 1.  Pleasant affect.      Data     I have personally reviewed the following data over the past 24 hrs:    N/A  \   N/A   / N/A     139 102 27.5 (H) /  105 (H)   3.9 23 0.90 \

## 2025-01-16 NOTE — PROGRESS NOTES
Occupational Therapy Discharge Summary    Reason for therapy discharge:    Pt at baseline for ADL    Progress towards therapy goal(s). See goals on Care Plan in [facility] electronic health record for goal details.  Goals met    Therapy recommendation(s):    No further therapy is recommended.

## 2025-01-17 ENCOUNTER — APPOINTMENT (OUTPATIENT)
Dept: PHYSICAL THERAPY | Facility: CLINIC | Age: 75
DRG: 190 | End: 2025-01-17
Payer: COMMERCIAL

## 2025-01-17 VITALS
WEIGHT: 200.9 LBS | BODY MASS INDEX: 28.13 KG/M2 | RESPIRATION RATE: 18 BRPM | SYSTOLIC BLOOD PRESSURE: 130 MMHG | HEIGHT: 71 IN | HEART RATE: 82 BPM | DIASTOLIC BLOOD PRESSURE: 70 MMHG | TEMPERATURE: 98.5 F | OXYGEN SATURATION: 98 %

## 2025-01-17 LAB — PLATELET # BLD AUTO: 264 10E3/UL (ref 150–450)

## 2025-01-17 PROCEDURE — 120N000001 HC R&B MED SURG/OB

## 2025-01-17 PROCEDURE — 97110 THERAPEUTIC EXERCISES: CPT | Mod: GP

## 2025-01-17 PROCEDURE — 250N000009 HC RX 250

## 2025-01-17 PROCEDURE — 250N000011 HC RX IP 250 OP 636

## 2025-01-17 PROCEDURE — 94640 AIRWAY INHALATION TREATMENT: CPT | Mod: 76

## 2025-01-17 PROCEDURE — 999N000157 HC STATISTIC RCP TIME EA 10 MIN

## 2025-01-17 PROCEDURE — 97161 PT EVAL LOW COMPLEX 20 MIN: CPT | Mod: GP

## 2025-01-17 PROCEDURE — 250N000012 HC RX MED GY IP 250 OP 636 PS 637

## 2025-01-17 PROCEDURE — 250N000013 HC RX MED GY IP 250 OP 250 PS 637

## 2025-01-17 PROCEDURE — 99233 SBSQ HOSP IP/OBS HIGH 50: CPT | Mod: GC | Performed by: DIETITIAN, REGISTERED

## 2025-01-17 PROCEDURE — 36415 COLL VENOUS BLD VENIPUNCTURE: CPT

## 2025-01-17 PROCEDURE — 85049 AUTOMATED PLATELET COUNT: CPT

## 2025-01-17 PROCEDURE — 94640 AIRWAY INHALATION TREATMENT: CPT

## 2025-01-17 RX ADMIN — DONEPEZIL HYDROCHLORIDE 10 MG: 10 TABLET ORAL at 22:36

## 2025-01-17 RX ADMIN — IPRATROPIUM BROMIDE AND ALBUTEROL SULFATE 3 ML: .5; 3 SOLUTION RESPIRATORY (INHALATION) at 07:32

## 2025-01-17 RX ADMIN — IPRATROPIUM BROMIDE AND ALBUTEROL SULFATE 3 ML: .5; 3 SOLUTION RESPIRATORY (INHALATION) at 11:46

## 2025-01-17 RX ADMIN — AZITHROMYCIN DIHYDRATE 250 MG: 250 TABLET, FILM COATED ORAL at 08:07

## 2025-01-17 RX ADMIN — HYDROCHLOROTHIAZIDE 50 MG: 25 TABLET ORAL at 08:06

## 2025-01-17 RX ADMIN — CETIRIZINE HYDROCHLORIDE 10 MG: 10 TABLET, FILM COATED ORAL at 08:06

## 2025-01-17 RX ADMIN — IPRATROPIUM BROMIDE AND ALBUTEROL SULFATE 3 ML: .5; 3 SOLUTION RESPIRATORY (INHALATION) at 15:39

## 2025-01-17 RX ADMIN — PREDNISONE 60 MG: 20 TABLET ORAL at 08:06

## 2025-01-17 RX ADMIN — AMOXICILLIN AND CLAVULANATE POTASSIUM 1 TABLET: 875; 125 TABLET, FILM COATED ORAL at 08:07

## 2025-01-17 RX ADMIN — IPRATROPIUM BROMIDE AND ALBUTEROL SULFATE 3 ML: .5; 3 SOLUTION RESPIRATORY (INHALATION) at 19:39

## 2025-01-17 RX ADMIN — LOSARTAN POTASSIUM 75 MG: 50 TABLET, FILM COATED ORAL at 08:06

## 2025-01-17 RX ADMIN — BUPROPION HYDROCHLORIDE 150 MG: 150 TABLET, EXTENDED RELEASE ORAL at 08:06

## 2025-01-17 RX ADMIN — AMOXICILLIN AND CLAVULANATE POTASSIUM 1 TABLET: 875; 125 TABLET, FILM COATED ORAL at 20:56

## 2025-01-17 RX ADMIN — ENOXAPARIN SODIUM 40 MG: 40 INJECTION SUBCUTANEOUS at 20:56

## 2025-01-17 ASSESSMENT — ACTIVITIES OF DAILY LIVING (ADL)
ADLS_ACUITY_SCORE: 78
ADLS_ACUITY_SCORE: 65
ADLS_ACUITY_SCORE: 66
ADLS_ACUITY_SCORE: 79
ADLS_ACUITY_SCORE: 79
ADLS_ACUITY_SCORE: 65
DEPENDENT_IADLS:: CLEANING;COOKING;LAUNDRY;SHOPPING;MEAL PREPARATION;MEDICATION MANAGEMENT;MONEY MANAGEMENT;TRANSPORTATION;INCONTINENCE
ADLS_ACUITY_SCORE: 78
ADLS_ACUITY_SCORE: 78
ADLS_ACUITY_SCORE: 65
ADLS_ACUITY_SCORE: 65
ADLS_ACUITY_SCORE: 66
ADLS_ACUITY_SCORE: 78
ADLS_ACUITY_SCORE: 65
ADLS_ACUITY_SCORE: 65
ADLS_ACUITY_SCORE: 78
ADLS_ACUITY_SCORE: 65
ADLS_ACUITY_SCORE: 79
ADLS_ACUITY_SCORE: 65
ADLS_ACUITY_SCORE: 65
ADLS_ACUITY_SCORE: 78
ADLS_ACUITY_SCORE: 78

## 2025-01-17 NOTE — PROGRESS NOTES
01/17/25 1415   Appointment Info   Signing Clinician's Name / Credentials (PT) Rosetta Mendez PT   Living Environment   People in Home facility resident   Current Living Arrangements other (see comments)  (memory care)   Home Accessibility no concerns   Self-Care   Equipment Currently Used at Home walker, rolling  (4WW)   Activity/Exercise/Self-Care Comment assist with ADL's and mobility; pt is poor historian; history taken from chart   General Information   Onset of Illness/Injury or Date of Surgery 01/14/25   Referring Physician Dr. Swain   Patient/Family Therapy Goals Statement (PT) none stated   Pertinent History of Current Problem (include personal factors and/or comorbidities that impact the POC) bronchiectasis exac.; PMH of dementia   Existing Precautions/Restrictions fall   Cognition   Affect/Mental Status (Cognition) confused   Orientation Status (Cognition) oriented to;person;disoriented to;place;situation;time   Follows Commands (Cognition) follows one-step commands;physical/tactile prompts required;repetition of directions required;verbal cues/prompting required   Range of Motion (ROM)   Range of Motion ROM is WFL   Strength (Manual Muscle Testing)   Strength (Manual Muscle Testing) Deficits observed during functional mobility   Bed Mobility   Bed Mobility supine-sit   Supine-Sit [place] (Bed Mobility) minimum assist (75% patient effort);verbal cues;nonverbal cues (demo/gesture)   Transfers   Transfers sit-stand transfer   Sit-Stand Transfer   Sit-Stand [place] (Transfers) minimum assist (75% patient effort);contact guard;supervision;verbal cues;nonverbal cues (demo/gesture)   Assistive Device (Sit-Stand Transfers) walker, front-wheeled   Comment, (Sit-Stand Transfer) cuing for hand placement   Gait/Stairs (Locomotion)   [place] Level (Gait) minimum assist (75% patient effort);verbal cues;nonverbal cues (demo/gesture)   Assistive Device (Gait) walker, front-wheeled;walker, 4-wheeled    Distance in Feet (Gait) 6, 50   Pattern (Gait) step-to   Deviations/Abnormal Patterns (Gait) randee decreased;weight shifting decreased;stride length decreased;gait speed decreased;other (see comments)  (O2 RA 88% with amb.; recovery to 92% on 1L then 91% on RA with rest)   Balance   Balance Comments assist of 1 standing with walker   Clinical Impression   Criteria for Skilled Therapeutic Intervention Yes, treatment indicated   PT Diagnosis (PT) impaired functional mobility   Influenced by the following impairments decreased strength, balance, activity tolerance, cognition   Functional limitations due to impairments bed mob., transfers, gait   Clinical Presentation (PT Evaluation Complexity) stable   Clinical Presentation Rationale pt presents as medically diagnosed   Clinical Decision Making (Complexity) low complexity   Planned Therapy Interventions (PT) balance training;bed mobility training;gait training;home exercise program;neuromuscular re-education;postural re-education;strengthening;stretching;transfer training   Risk & Benefits of therapy have been explained evaluation/treatment results reviewed;patient   PT Total Evaluation Time   PT Eval, Low Complexity Minutes (56662) 15   Physical Therapy Goals   PT Frequency 4x/week   PT Predicted Duration/Target Date for Goal Attainment 01/24/25   PT Goals Bed Mobility;Transfers;Gait   PT: Bed Mobility Supervision/stand-by assist;Supine to/from sit   PT: Transfers Supervision/stand-by assist;Sit to/from stand;Assistive device   PT: Gait Minimal assist;100 feet;Rolling walker   Interventions   Interventions Quick Adds Therapeutic Procedure   Therapeutic Procedure/Exercise   Ther. Procedure: strength, endurance, ROM, flexibillity Minutes (76177) 8   Symptoms Noted During/After Treatment shortness of breath;fatigue   Treatment Detail/Skilled Intervention seated BLE ex 2x5 reps and standing BLE ex x5-10 reps with UE support with cuing and demonstration for technique;  chair push-ups x5 with sba and demonstration   PT Discharge Planning   PT Plan bring 4WW for gait; LE ex   PT Discharge Recommendation (DC Rec) home with assist;home with home care physical therapy   PT Rationale for DC Rec min assist for transfers and amb. ; continue with assist of 1 and home PT for strengthening   PT Brief overview of current status amb. 50 feet with 4WW min assist   PT Total Distance Amb During Session (feet) 56   PT Equipment Needed at Discharge wheelchair;walker, rolling  (pt has 4WW per chart; may need w/c for longer distances)   Physical Therapy Time and Intention   Timed Code Treatment Minutes 8   Total Session Time (sum of timed and untimed services) 23

## 2025-01-17 NOTE — PLAN OF CARE
"  Problem: Adult Inpatient Plan of Care  Goal: Patient-Specific Goal (Individualized)  Description: You can add care plan individualizations to a care plan. Examples of Individualization might be:  \"Parent requests to be called daily at 9am for status\", \"I have a hard time hearing out of my right ear\", or \"Do not touch me to wake me up as it startles  me\".  Outcome: Progressing     Problem: Adult Inpatient Plan of Care  Goal: Absence of Hospital-Acquired Illness or Injury  Outcome: Progressing   Goal Outcome Evaluation:       VSS on RA. Pt up with AGBW. Reg diet tolerating well. No IV, MD aware. Discharge pending                 "

## 2025-01-17 NOTE — PROGRESS NOTES
[facility]    Progress Note - Hospitalist Service       Date of Admission:  1/14/2025    Assessment & Plan   Stephanie Acuna is a 74 year old female admitted on 1/14/2025. She she has a history of chronic bronchiectasis, asthma, allergies, hypertension, DVT, and dementia, and is admitted for AHRF 2/2 asthma and bronchiectasis exacerbations.    Patient is medically ready for discharge, pending coordination with her LTC facility given possible need for supplemental O2 on discharge.     Acute hypoxic respiratory failure secondary to asthma exacerbation  Chronic bronchiectasis exacerbation  Patient has diagnosis of asthma given an IgE level greater than 400 as well as peripheral eosinophilia seen back in 2018 (no formal PFTs). This has typically been well-controlled with Symbicort.  She also has chronic bronchiectasis that has had flareups requiring steroids in the past. Treating current symptoms as a chronic bronchiectasis flare. Normal BNP and WBC on admission. Viral panel negative. CT findings with bronchiectasis throughout and mucous plugging. CT PE negative. S/p 2 mg magnesium IV infusion x1 and 500 mg azithromycin IV loading dose on 1/14. VBG without CO2 retention. S/p IV ceftriaxone 1/14-1/15. Improving respiratory exam although remains short of breath with activity.  - Augmentin 875-125 mg BID x3 days (1/16-1/18)  - Azithromycin 250 mg PO daily x5 days (1/14-1/18)  - Prednisone 60 mg PO daily x5 days (1/14-1/18)  - DuoNebs every 4 hours per RT  - PTA symbicort   - PT/OT consult     ALLIE, resolved  Patient presented with creatinine of 1.49 baseline appears to be 0.8-0.9. Resolved with IVF.  -AM BMP     Allergies  - PTA daily cetirizine 10 mg     Hypertension  - PTA hydrochlorothiazide 25 mg daily  - PTA losartan 50 mg daily     Anxiety  - PTA bupropion 150 mg daily     Dementia  - PTA donepezil         Diet: Combination Diet Regular Diet Adult    DVT Prophylaxis: Enoxaparin  "(Lovenox) SQ  Rubio Catheter: Not present  Fluids: PO  Lines: None     Cardiac Monitoring: None  Code Status: No CPR- Do NOT Intubate      Clinically Significant Risk Factors                              # Overweight: Estimated body mass index is 27.43 kg/m  as calculated from the following:    Height as of this encounter: 1.803 m (5' 11\").    Weight as of this encounter: 89.2 kg (196 lb 10.4 oz)., PRESENT ON ADMISSION     # Asthma: noted on problem list        Social Drivers of Health   Tobacco Use: Medium Risk (7/4/2024)    [facility]    Patient History     Smoking Tobacco Use: Former     Smokeless Tobacco Use: Never     Passive Exposure: Past         Disposition Plan     Medically Ready for Discharge: Anticipated in 2-4 Days       The patient's care was discussed with the Attending Physician, Dr. Odom .    Letty Swain MD  Hospitalist Service  [facility]  Securely message with [employer] (more info)  Text page via [employer] Paging/Directory   ______________________________________________________________________    Interval History   NAEO. Less coughing, down on O2 requirements. Still short of breath and coughing more with activity.     Physical Exam   Vital Signs: Temp: 98.5  F (36.9  C) Temp src: Oral BP: 130/70 Pulse: 82   Resp: 18 SpO2: 94 % O2 Device: Nasal cannula Oxygen Delivery: 1 LPM  Weight: 196 lbs 10.41 oz    GENERAL: Awake, alert, No acute distress.   HEENT: No scleral icterus or conjunctival injection.  SKIN: Warm and dry. No bruises, rashes, or skin lesions.  LUNGS: NC in place.  Normal work of breathing with no use of accessory muscles.  Good air movement. Diffuse rhonchi. No crackles or wheezing. appreciated.   CARDIAC: RRR per monitor. Distant heart sounds. No peripheral edema.  ABDOMEN: Soft, non-tender, non-distended.   NEUROLOGIC: Alert.  A&O x 1.  Pleasant affect.      Data   No new labs.  "

## 2025-01-17 NOTE — PLAN OF CARE
Problem: Adult Inpatient Plan of Care  Goal: Optimal Comfort and Wellbeing  Outcome: Progressing     Problem: Fall Injury Risk  Goal: Absence of Fall and Fall-Related Injury  Outcome: Progressing  Intervention: Identify and Manage Contributors  Recent Flowsheet Documentation  Taken 1/16/2025 1727 by Lilliam Jonas RN  Medication Review/Management: medications reviewed  Intervention: Promote Injury-Free Environment  Recent Flowsheet Documentation  Taken 1/16/2025 1727 by Lilliam Jonas RN  Safety Promotion/Fall Prevention:   safety round/check completed   room near nurse's station   room door open   clutter free environment maintained     Problem: Pain Acute  Goal: Optimal Pain Control and Function  Outcome: Progressing  Intervention: Prevent or Manage Pain  Recent Flowsheet Documentation  Taken 1/16/2025 1727 by Lilliam Jonas RN  Medication Review/Management: medications reviewed   Goal Outcome Evaluation:    Patient alert and oriented to self, knows she is in hospital, but not specific name or location of facility, know month and year but not specific date or day.  Lungs diminished with expiratory wheezing on 2.5 LPM via NC continuous.  Denies pain.  No edema.  Has scattered bruises on body, and dry skin.  Buttock has healed scar area area intact. Last BM per patient 1/15/25.  Bed alarm on and reviewed call light, phone, bill of rights and how to use phone.

## 2025-01-17 NOTE — CONSULTS
Care Management Initial Consult    General Information  Assessment completed with: Children, Other ([facility] Nurse),    Type of CM/SW Visit: Initial Assessment    Primary Care Provider verified and updated as needed: Yes   Readmission within the last 30 days:        Reason for Consult: discharge planning  Advance Care Planning: Advance Care Planning Reviewed: present on chart          Communication Assessment  Patient's communication style: spoken language (English or Bilingual)    Hearing Difficulty or Deaf: no   Wear Glasses or Blind: yes    Cognitive  Cognitive/Neuro/Behavioral: .WDL except, orientation  Level of Consciousness: confused  Arousal Level: opens eyes spontaneously  Orientation: disoriented to, place, time, situation  Mood/Behavior: calm, cooperative  Best Language: 0 - No aphasia  Speech: other (see comments) (one word answers an points to area)    Living Environment:   People in home: alone, facility resident (Memory Care)  [facility]  Current living Arrangements:        Able to return to prior arrangements:         Family/Social Support:  Care provided by: other (see comments) ([facility] staff)  Provides care for:       Support system: Children          Description of Support System:           Current Resources:   Patient receiving home care services: Yes  Skilled Home Care Services: Physical Therapy, Occupational Therapy     Community Resources:    Equipment currently used at home: walker, rolling (4WW)  Supplies currently used at home:      Employment/Financial:  Employment Status: retired        Financial Concerns:             Does the patient's insurance plan have a 3 day qualifying hospital stay waiver?  No    Lifestyle & Psychosocial Needs:  Social Drivers of Health     Food Insecurity: No Food Insecurity (7/4/2024)    [facility] from [facility]    Hunger Vital Sign     Worried About Running Out of Food in the Last Year: Never true      Ran Out of Food in the Last Year: Never true   Depression: Not on file   Housing Stability: Low Risk  (7/4/2024)    Received from [facility]    Housing Stability Vital Sign     Unable to Pay for Housing in the Last Year: No     Number of Times Moved in the Last Year: 0     Homeless in the Last Year: No   Tobacco Use: Medium Risk (7/4/2024)    Received from [facility]    Patient History     Smoking Tobacco Use: Former     Smokeless Tobacco Use: Never     Passive Exposure: Past   Financial Resource Strain: Not on file   Alcohol Use: Not on file   Transportation Needs: No Transportation Needs (7/4/2024)    Received from [facility]    PRAPARE - Transportation     Lack of Transportation (Medical): No     Lack of Transportation (Non-Medical): No   Physical Activity: Not on file   Interpersonal Safety: Low Risk  (1/16/2025)    Interpersonal Safety     Do you feel physically and emotionally safe where you currently live?: Yes     Within the past 12 months, have you been hit, slapped, kicked or otherwise physically hurt by someone?: No     Within the past 12 months, have you been humiliated or emotionally abused in other ways by your partner or ex-partner?: No   Stress: Not on file   Social Connections: Not on file   Health Literacy: Not on file       Functional Status:  Prior to admission patient needed assistance:   Dependent ADLs:: Ambulation-walker, Bathing, Dressing, Grooming, Incontinence, Transfers, Toileting  Dependent IADLs:: Cleaning, Cooking, Laundry, Shopping, Meal Preparation, Medication Management, Money Management, Transportation, Incontinence       Mental Health Status:          Chemical Dependency Status:  Chemical Dependency Status: No Current Concerns             Values/Beliefs:  Spiritual, Cultural Beliefs, Baptism Practices, Values that affect care:                 Discussed  Partnership in  Safe Discharge Planning  document with patient/family: No    Additional Information:  Assessed via pt's daughter Elizabeth present at bedside.  Pt resides at [facility].  Pt uses a walker with assistx1, and receives assistance for all I/ADL's, including medication management, escorts to meals.  [facility] coordinated homecare PT and OT for this pt through [facility].  Pt does not use home O2 at baseline.  Daughter Elizabeth confirms discharge goal is to return to [facility].    Transportation -  CM discussed potential out of pocket cost of [facility] wheelchair/stretcher transportation with Elizabeth.  Elizabeth states family may be able to transport at time of discharge.    [facility] [phone]; Fax [phone] - Nurse Interiano [phone] confirms pt's baseline. They use [facility].  Pt welcome to return to this facility Mon-Fri before 1200pm noon preferred.    Nurse Interiano refused to accept pt back today; stating pt expected to arrive back before 1400pm today, and time of this conversation with Laisha already 1330pm.    Nurse Interiano states they will not accept weekend admits d/t no nurse on-site over the weekend to assess pt for change in condition. [facility] O2 is considered change in condition.    Next Steps:     CM will continue to follow.    Karmen Hartmann RN

## 2025-01-17 NOTE — PLAN OF CARE
Problem: Adult Inpatient Plan of Care  Goal: Optimal Comfort and Wellbeing  Outcome: Progressing     Problem: Gas Exchange Impaired  Goal: Optimal Gas Exchange  Outcome: Progressing     Patient disoriented to situation and time.  VSS.  Denies nausea, SOB and pain.  Was able to wean patient from oxygen tonight from 2.5L to 1L.  She has an infrequent cough.  Alarms are on for safety.

## 2025-01-17 NOTE — PLAN OF CARE
Goal Outcome Evaluation:      Plan of Care Reviewed With: patient, child  Goal: Readiness for Transition of Care  Outcome: Progressing  Flowsheets (Taken 1/17/2025 1330)  Transportation Anticipated: family or friend will provide  Concerns to be Addressed: discharge planning  Intervention: Mutually Develop Transition Plan  Recent Flowsheet Documentation  Taken 1/17/2025 1330 by Karmen Hartmann RN  Transportation Anticipated: family or friend will provide  Concerns to be Addressed: discharge planning  Patient/Family Anticipates Transition to:   assisted living   home with help/services  Offered/Gave Vendor List: yes    Outcome Evaluation: Anticipate return to MemoryCare at discharge    Karmen Hartmann RN CM

## 2025-01-17 NOTE — PLAN OF CARE
Problem: Adult Inpatient Plan of Care  Goal: Plan of Care Review  Description: The Plan of Care Review/Shift note should be completed every shift.  The Outcome Evaluation is a brief statement about your assessment that the patient is improving, declining, or no change.  This information will be displayed automatically on your shift  note.     Goal Outcome Evaluation:  VSS and afebrile. Sating in mid 90s on 2-2.5L via NC. Patient pulled out IV, provider gave okay to leave out.

## 2025-01-17 NOTE — PROGRESS NOTES
Pt last seen on RA, SpO2 92%, BS diminished, expiratory wheezing, aeration increased post neb, no SOB reports, RR 20-24, scheduled neb given. RT following.    Silas Garcia, RT

## 2025-01-18 PROCEDURE — 250N000013 HC RX MED GY IP 250 OP 250 PS 637

## 2025-01-18 PROCEDURE — 94640 AIRWAY INHALATION TREATMENT: CPT

## 2025-01-18 PROCEDURE — 272N000202 HC AEROBIKA WITH MANOMETER

## 2025-01-18 PROCEDURE — 120N000001 HC R&B MED SURG/OB

## 2025-01-18 PROCEDURE — 250N000011 HC RX IP 250 OP 636

## 2025-01-18 PROCEDURE — 94799 UNLISTED PULMONARY SVC/PX: CPT

## 2025-01-18 PROCEDURE — 999N000157 HC STATISTIC RCP TIME EA 10 MIN

## 2025-01-18 PROCEDURE — 99232 SBSQ HOSP IP/OBS MODERATE 35: CPT | Mod: GC | Performed by: DIETITIAN, REGISTERED

## 2025-01-18 PROCEDURE — 250N000012 HC RX MED GY IP 250 OP 636 PS 637

## 2025-01-18 PROCEDURE — 94640 AIRWAY INHALATION TREATMENT: CPT | Mod: 76

## 2025-01-18 PROCEDURE — 250N000009 HC RX 250

## 2025-01-18 RX ADMIN — HYDROCHLOROTHIAZIDE 50 MG: 25 TABLET ORAL at 08:07

## 2025-01-18 RX ADMIN — BUPROPION HYDROCHLORIDE 150 MG: 150 TABLET, EXTENDED RELEASE ORAL at 08:07

## 2025-01-18 RX ADMIN — AMOXICILLIN AND CLAVULANATE POTASSIUM 1 TABLET: 875; 125 TABLET, FILM COATED ORAL at 08:07

## 2025-01-18 RX ADMIN — IPRATROPIUM BROMIDE AND ALBUTEROL SULFATE 3 ML: .5; 3 SOLUTION RESPIRATORY (INHALATION) at 15:48

## 2025-01-18 RX ADMIN — IPRATROPIUM BROMIDE AND ALBUTEROL SULFATE 3 ML: .5; 3 SOLUTION RESPIRATORY (INHALATION) at 20:30

## 2025-01-18 RX ADMIN — IPRATROPIUM BROMIDE AND ALBUTEROL SULFATE 3 ML: .5; 3 SOLUTION RESPIRATORY (INHALATION) at 07:43

## 2025-01-18 RX ADMIN — DONEPEZIL HYDROCHLORIDE 10 MG: 10 TABLET ORAL at 21:04

## 2025-01-18 RX ADMIN — IPRATROPIUM BROMIDE AND ALBUTEROL SULFATE 3 ML: .5; 3 SOLUTION RESPIRATORY (INHALATION) at 11:40

## 2025-01-18 RX ADMIN — CETIRIZINE HYDROCHLORIDE 10 MG: 10 TABLET, FILM COATED ORAL at 08:07

## 2025-01-18 RX ADMIN — AZITHROMYCIN DIHYDRATE 250 MG: 250 TABLET, FILM COATED ORAL at 08:07

## 2025-01-18 RX ADMIN — LOSARTAN POTASSIUM 75 MG: 50 TABLET, FILM COATED ORAL at 08:08

## 2025-01-18 RX ADMIN — FLUTICASONE FUROATE AND VILANTEROL TRIFENATATE 1 PUFF: 100; 25 POWDER RESPIRATORY (INHALATION) at 08:09

## 2025-01-18 RX ADMIN — PREDNISONE 60 MG: 20 TABLET ORAL at 08:07

## 2025-01-18 RX ADMIN — AMOXICILLIN AND CLAVULANATE POTASSIUM 1 TABLET: 875; 125 TABLET, FILM COATED ORAL at 21:01

## 2025-01-18 RX ADMIN — ENOXAPARIN SODIUM 40 MG: 40 INJECTION SUBCUTANEOUS at 21:01

## 2025-01-18 ASSESSMENT — ACTIVITIES OF DAILY LIVING (ADL)
ADLS_ACUITY_SCORE: 79
ADLS_ACUITY_SCORE: 78
ADLS_ACUITY_SCORE: 79

## 2025-01-18 NOTE — PROGRESS NOTES
[facility]    Progress Note - Hospitalist Service       Date of Admission:  1/14/2025    Assessment & Plan   Stephanie Acuna is a 74 year old female admitted on 1/14/2025. She she has a history of chronic bronchiectasis, asthma, allergies, hypertension, DVT, and dementia, and is admitted for AHRF 2/2 asthma and bronchiectasis exacerbations.     Patient is medically ready for discharge, pending coordination with her LTC facility.     Acute hypoxic respiratory failure secondary to asthma exacerbation, improving  Chronic bronchiectasis exacerbation, improving  Patient has diagnosis of asthma given an IgE level greater than 400 as well as peripheral eosinophilia seen back in 2018 (no formal PFTs). This has typically been well-controlled with Symbicort.  She also has chronic bronchiectasis that has had flareups requiring steroids in the past. Treating current symptoms as a chronic bronchiectasis flare. Normal BNP and WBC on admission. Viral panel negative. CT findings with bronchiectasis throughout and mucous plugging. CT PE negative. VBG without CO2 retention. S/p 2 mg magnesium IV infusion x1 and 500 mg azithromycin IV loading dose on 1/14. S/p IV ceftriaxone 1/14-1/15. Patient significantly improved.  - Augmentin 875-125 mg BID x3 days (1/16-1/18)  - Azithromycin 250 mg PO daily x5 days (1/14-1/18)  - Prednisone 60 mg PO daily x5 days (1/14-1/18)  - DuoNebs every 4 hours per RT  - PTA symbicort   - PT/OT/SW consult     ALILE, resolved  Patient presented with creatinine of 1.49 baseline appears to be 0.8-0.9. Resolved with IVF.     Allergies  - PTA daily cetirizine 10 mg     Hypertension  - PTA hydrochlorothiazide 25 mg daily  - PTA losartan 50 mg daily     Anxiety  - PTA bupropion 150 mg daily     Dementia  - PTA donepezil         Diet: Combination Diet Regular Diet Adult    DVT Prophylaxis: Enoxaparin (Lovenox) SQ  Rubio Catheter: Not present  Fluids: PO  Lines: None     Cardiac  "Monitoring: None  Code Status: No CPR- Do NOT Intubate      Clinically Significant Risk Factors                              # Overweight: Estimated body mass index is 27.4 kg/m  as calculated from the following:    Height as of this encounter: 1.803 m (5' 11\").    Weight as of this encounter: 89.1 kg (196 lb 6.9 oz)., PRESENT ON ADMISSION     # Asthma: noted on problem list        Social Drivers of Health   Tobacco Use: Medium Risk (7/4/2024)    Received from [facility] and [facility]    Patient History     Smoking Tobacco Use: Former     Smokeless Tobacco Use: Never     Passive Exposure: Past         Disposition Plan     Medically Ready for Discharge: Anticipated in 2-4 Days         The patient's care was discussed with the Attending Physician, Dr. Odom .    Letty Swain MD  Hospitalist Service  [facility]  Securely message with [employer] (more info)  Text page via [employer] Paging/Directory   ______________________________________________________________________    Interval History   SHREYAS Brown was on 1.5L O2 overnight.  This morning I witnessed her ambulating from the bathroom back to her chair.  She did not seem exceptionally short of breath or to have exacerbated coughing while ambulating, however she would likely benefit from some focused physical therapy.    Physical Exam   Vital Signs: Temp: 98.5  F (36.9  C) Temp src: Oral BP: 136/65 Pulse: 66   Resp: 20 SpO2: 95 % O2 Device: Nasal cannula Oxygen Delivery: 1 LPM  Weight: 196 lbs 6.88 oz    GENERAL: Awake, alert, No acute distress.  Ambulating from bathroom to chair with walker and standby assist.  HEENT: No scleral icterus or conjunctival injection.  SKIN: Warm and dry. No bruises, rashes, or skin lesions.  LUNGS: NC in place.  Normal work of breathing with no use of accessory muscles.  Good air movement. Diffuse rhonchi.  Expiratory wheeze in left lower lobe.    CARDIAC: Regular rate per monitor. Distant " heart sounds. No peripheral edema.  ABDOMEN: Soft, non-tender, non-distended.   NEUROLOGIC: Alert.  Pleasant affect.      Data   No new labs.

## 2025-01-18 NOTE — PLAN OF CARE
Problem: Adult Inpatient Plan of Care  Goal: Absence of Hospital-Acquired Illness or Injury  Intervention: Identify and Manage Fall Risk  Recent Flowsheet Documentation  Taken 1/17/2025 2100 by Katy Kendrick RN  Safety Promotion/Fall Prevention:   safety round/check completed   room organization consistent   nonskid shoes/slippers when out of bed   lighting adjusted     Problem: Adult Inpatient Plan of Care  Goal: Absence of Hospital-Acquired Illness or Injury  Intervention: Prevent Skin Injury  Recent Flowsheet Documentation  Taken 1/17/2025 2240 by Katy Kendrick RN  Body Position:   turned   right  Taken 1/17/2025 2100 by Katy Kendrick RN  Skin Protection: adhesive use limited     Problem: Gas Exchange Impaired  Goal: Optimal Gas Exchange  Outcome: Progressing  Intervention: Optimize Oxygenation and Ventilation  Recent Flowsheet Documentation  Taken 1/17/2025 2240 by Katy Kendrick RN  Head of Bed (HOB) Positioning: HOB at 15 degrees   Goal Outcome Evaluation:    Pt alert, disoriented to time, and situation. VSS on 1L via NC. Sats 89-92% through night. Denies pain. No IV, provider aware. Assist x1-2, GB, walker. Q2 turns encouraged. Pt unable to make needs known, frequent rounding done on pt. Bed in low, alarm on for safety.

## 2025-01-18 NOTE — PLAN OF CARE
"  Problem: Adult Inpatient Plan of Care  Goal: Plan of Care Review  Description: The Plan of Care Review/Shift note should be completed every shift.  The Outcome Evaluation is a brief statement about your assessment that the patient is improving, declining, or no change.  This information will be displayed automatically on your shift  note.  Outcome: Progressing     Problem: Adult Inpatient Plan of Care  Goal: Patient-Specific Goal (Individualized)  Description: You can add care plan individualizations to a care plan. Examples of Individualization might be:  \"Parent requests to be called daily at 9am for status\", \"I have a hard time hearing out of my right ear\", or \"Do not touch me to wake me up as it startles  me\".  Outcome: Progressing   Goal Outcome Evaluation:       VSS on RA. Writer trying to wean pt off O2. Productive cough. Up in chair for lunch. Daughter in room. Waiting on discharge                 "

## 2025-01-18 NOTE — PLAN OF CARE
Problem: Adult Inpatient Plan of Care  Goal: Optimal Comfort and Wellbeing  Outcome: Progressing     Problem: Gas Exchange Impaired  Goal: Optimal Gas Exchange  Outcome: Progressing     Problem: Fall Injury Risk  Goal: Absence of Fall and Fall-Related Injury  Outcome: Progressing     Goal Outcome Evaluation:  Pt is A&O to self and place, patient does not calls appropriately for needs- needs to be checked on in person and is an assist 1 with GB/Walker for ambulation. Vitals signs are stable, afebrile, oxygen is on room air. Pt complains of pain 0/10. Pt is tolerating regular diet. Pt is voiding spontaneously, last bowel movement unknown.  Alarms in place.      Susana Holliday RN  January 17, 2025, 7:30 PM

## 2025-01-19 PROCEDURE — 94799 UNLISTED PULMONARY SVC/PX: CPT

## 2025-01-19 PROCEDURE — 99232 SBSQ HOSP IP/OBS MODERATE 35: CPT | Mod: GC

## 2025-01-19 PROCEDURE — 250N000013 HC RX MED GY IP 250 OP 250 PS 637

## 2025-01-19 PROCEDURE — 94640 AIRWAY INHALATION TREATMENT: CPT

## 2025-01-19 PROCEDURE — 250N000011 HC RX IP 250 OP 636

## 2025-01-19 PROCEDURE — 94640 AIRWAY INHALATION TREATMENT: CPT | Mod: 76

## 2025-01-19 PROCEDURE — 250N000009 HC RX 250

## 2025-01-19 PROCEDURE — 120N000001 HC R&B MED SURG/OB

## 2025-01-19 PROCEDURE — 999N000157 HC STATISTIC RCP TIME EA 10 MIN

## 2025-01-19 RX ADMIN — IPRATROPIUM BROMIDE AND ALBUTEROL SULFATE 3 ML: .5; 3 SOLUTION RESPIRATORY (INHALATION) at 15:54

## 2025-01-19 RX ADMIN — HYDROCHLOROTHIAZIDE 50 MG: 25 TABLET ORAL at 08:14

## 2025-01-19 RX ADMIN — FLUTICASONE FUROATE AND VILANTEROL TRIFENATATE 1 PUFF: 100; 25 POWDER RESPIRATORY (INHALATION) at 08:17

## 2025-01-19 RX ADMIN — AMOXICILLIN AND CLAVULANATE POTASSIUM 1 TABLET: 875; 125 TABLET, FILM COATED ORAL at 08:14

## 2025-01-19 RX ADMIN — BUPROPION HYDROCHLORIDE 150 MG: 150 TABLET, EXTENDED RELEASE ORAL at 08:14

## 2025-01-19 RX ADMIN — AZITHROMYCIN DIHYDRATE 250 MG: 250 TABLET, FILM COATED ORAL at 08:14

## 2025-01-19 RX ADMIN — IPRATROPIUM BROMIDE AND ALBUTEROL SULFATE 3 ML: .5; 3 SOLUTION RESPIRATORY (INHALATION) at 20:43

## 2025-01-19 RX ADMIN — IPRATROPIUM BROMIDE AND ALBUTEROL SULFATE 3 ML: .5; 3 SOLUTION RESPIRATORY (INHALATION) at 11:46

## 2025-01-19 RX ADMIN — IPRATROPIUM BROMIDE AND ALBUTEROL SULFATE 3 ML: .5; 3 SOLUTION RESPIRATORY (INHALATION) at 08:28

## 2025-01-19 RX ADMIN — CETIRIZINE HYDROCHLORIDE 10 MG: 10 TABLET, FILM COATED ORAL at 08:13

## 2025-01-19 RX ADMIN — DONEPEZIL HYDROCHLORIDE 10 MG: 10 TABLET ORAL at 21:16

## 2025-01-19 RX ADMIN — LOSARTAN POTASSIUM 75 MG: 50 TABLET, FILM COATED ORAL at 08:14

## 2025-01-19 RX ADMIN — ENOXAPARIN SODIUM 40 MG: 40 INJECTION SUBCUTANEOUS at 20:04

## 2025-01-19 ASSESSMENT — ACTIVITIES OF DAILY LIVING (ADL)
ADLS_ACUITY_SCORE: 78
ADLS_ACUITY_SCORE: 78
ADLS_ACUITY_SCORE: 79
ADLS_ACUITY_SCORE: 78
ADLS_ACUITY_SCORE: 78
ADLS_ACUITY_SCORE: 79
ADLS_ACUITY_SCORE: 79
ADLS_ACUITY_SCORE: 78
ADLS_ACUITY_SCORE: 78
ADLS_ACUITY_SCORE: 79
ADLS_ACUITY_SCORE: 78
ADLS_ACUITY_SCORE: 79
ADLS_ACUITY_SCORE: 78
ADLS_ACUITY_SCORE: 79
ADLS_ACUITY_SCORE: 78
ADLS_ACUITY_SCORE: 79

## 2025-01-19 NOTE — PLAN OF CARE
"  Problem: Adult Inpatient Plan of Care  Goal: Patient-Specific Goal (Individualized)  Description: You can add care plan individualizations to a care plan. Examples of Individualization might be:  \"Parent requests to be called daily at 9am for status\", \"I have a hard time hearing out of my right ear\", or \"Do not touch me to wake me up as it startles  me\".  Outcome: Progressing     Problem: Adult Inpatient Plan of Care  Goal: Absence of Hospital-Acquired Illness or Injury  Outcome: Progressing   Goal Outcome Evaluation:       VSS on RA. Reg diet tolerating well. Up A1-2GBW. Discharge pending                  "

## 2025-01-19 NOTE — CARE PLAN
Unable to answer orientation questions, flat affect, knows family. Rested in afternoon. Feeding assistance for dinner. 1L at 95%, infrequent cough. VSS. Anticipate returning to Memory Care.

## 2025-01-19 NOTE — PROGRESS NOTES
Pt is on O2 1 LPM NC, SpO2 93%, BS wheezing, aerations increased post neb, pt has a congest cough, non productive, writer gave pt Flutter valve, instructions done, pt demonstrated back well, Flutter Valve done with neb QID for airway clearance, scheduled neb given. RT following.    Silas Garcia, RT

## 2025-01-19 NOTE — PLAN OF CARE
Problem: Fall Injury Risk  Goal: Absence of Fall and Fall-Related Injury  Intervention: Promote Injury-Free Environment  Recent Flowsheet Documentation  Taken 1/18/2025 2110 by Katy Kendrick, RN  Safety Promotion/Fall Prevention:   safety round/check completed   room organization consistent   nonskid shoes/slippers when out of bed   lighting adjusted     Problem: Gas Exchange Impaired  Goal: Optimal Gas Exchange  Outcome: Progressing     Problem: Adult Inpatient Plan of Care  Goal: Absence of Hospital-Acquired Illness or Injury  Intervention: Prevent Skin Injury  Recent Flowsheet Documentation  Taken 1/18/2025 2110 by Katy Kendrick, RN  Skin Protection: adhesive use limited   Goal Outcome Evaluation:    Pt alert, oriented to self and place. VSS at this time, on 1.5L O2 via NC to keep sats >92%. Pt denies pain through shift. Up to bedside commode assist x1-2 GB walker overnight. Incontinent at times. Q2 turned. Bed in low, alarm on for safety, frequent rounding done on pt.

## 2025-01-19 NOTE — PROGRESS NOTES
Pt last seen on RA, SpO2 92%, BS expiratory wheezing pre and post neb, no respiratory distress noted, flutter valve done with neb qid, pt tolerated treatments well. RT following.    Silas Garcia, RT

## 2025-01-19 NOTE — PROGRESS NOTES
"Care Management Follow Up    Length of Stay (days): 5    Expected Discharge Date: 01/20/2025     Concerns to be Addressed: discharge planning     Patient plan of care discussed at interdisciplinary rounds: Yes    Anticipated Discharge Disposition: Assisted Living, Home Care     Anticipated Discharge Services: None  Anticipated Discharge DME: None    Patient/family educated on Medicare website which has current facility and service quality ratings: yes  Education Provided on the Discharge Plan: Yes  Patient/Family in Agreement with the Plan:      Referrals Placed by CM/SW: Post Acute Facilities  Private pay costs discussed: Not applicable    Discussed  Partnership in Safe Discharge Planning  document with patient/family: No     Handoff Completed: No, handoff not indicated or clinically appropriate    Additional Information:    Pt resides at [facility], had homecare through [facility], OT as coordinated by [facility].    Pt has been medically ready for discharge with supplemental O2 since 1/17 per review of Dr. Letty Swain's notes 1/17 and 1/18.     Per Dr. Urban of [facility], ordering home O2 eval in anticipation of discharge.    CM attempting to see if pt's facility can accept pt back today Sun 1/19.    11:05 AM  [facility] - [phone]; Fax [phone] - CM called.  Per  Gracie: Nurse Cell [phone].  CM left voicemail with nurse cell.    11:09 AM  CM called again; Gracie to notify nursing staff to call CM.  CM awaiting return call from facility.    11:12 AM  Daughter Elizabeth present at bedside and willing to transport if pt requires new O2 to avoid sending pt on [facility] stretcher.  If pt does not require new O2, Elizabeth would consider [facility] wheelchair if safe to transport by wheelchair.    11:36 AM  [facility] [phone]; Fax [phone] - Nurse Laisha [phone] - Laisha refused to accept pt back today stating \"We do not have a nurse on-site to accept pt " "back over the weekend.  She (patient) can come back Monday morning.\"    By Law  Per [place] Administrative Rules:  4659.0140 INITIAL ASSESSMENTS AND CONTINUING ASSESSMENTS,   \"Subp. 7. Weekend assessments. An assisted living facility must be able to conduct a nursing assessment on a holiday or on a weekend for a resident who is ready to be discharged from the hospital and return to the facility.\" https://www.revisor.[place].gov/rules/4659.0140/    [place] Ombudsman for LTC - [phone] - Per protocol, RADHA called; left voicemail notify [facility].  Ombudsman Office is closed Mon 1/20/25 per voicemail.    Next Steps:   Coordinate discharge with facility  Transportation -coordinate with daughter Elizabeth    RADHA will continue to follow.     Karmen Hartmann RN    "

## 2025-01-20 VITALS
HEART RATE: 73 BPM | BODY MASS INDEX: 27.84 KG/M2 | WEIGHT: 198.85 LBS | HEIGHT: 71 IN | TEMPERATURE: 97.1 F | OXYGEN SATURATION: 95 % | SYSTOLIC BLOOD PRESSURE: 108 MMHG | RESPIRATION RATE: 18 BRPM | DIASTOLIC BLOOD PRESSURE: 58 MMHG

## 2025-01-20 LAB
ATRIAL RATE - MUSE: 76 BPM
CREAT SERPL-MCNC: 0.99 MG/DL (ref 0.51–0.95)
DIASTOLIC BLOOD PRESSURE - MUSE: NORMAL MMHG
EGFRCR SERPLBLD CKD-EPI 2021: 59 ML/MIN/1.73M2
INTERPRETATION ECG - MUSE: NORMAL
P AXIS - MUSE: 31 DEGREES
PLATELET # BLD AUTO: 298 10E3/UL (ref 150–450)
PR INTERVAL - MUSE: 176 MS
QRS DURATION - MUSE: 82 MS
QT - MUSE: 398 MS
QTC - MUSE: 447 MS
R AXIS - MUSE: 48 DEGREES
SYSTOLIC BLOOD PRESSURE - MUSE: NORMAL MMHG
T AXIS - MUSE: 43 DEGREES
VENTRICULAR RATE- MUSE: 76 BPM

## 2025-01-20 PROCEDURE — 94640 AIRWAY INHALATION TREATMENT: CPT | Mod: 76

## 2025-01-20 PROCEDURE — 250N000009 HC RX 250

## 2025-01-20 PROCEDURE — 36415 COLL VENOUS BLD VENIPUNCTURE: CPT

## 2025-01-20 PROCEDURE — 85049 AUTOMATED PLATELET COUNT: CPT

## 2025-01-20 PROCEDURE — 94640 AIRWAY INHALATION TREATMENT: CPT

## 2025-01-20 PROCEDURE — 99238 HOSP IP/OBS DSCHRG MGMT 30/<: CPT | Mod: GC | Performed by: DIETITIAN, REGISTERED

## 2025-01-20 PROCEDURE — 250N000013 HC RX MED GY IP 250 OP 250 PS 637

## 2025-01-20 PROCEDURE — 93010 ELECTROCARDIOGRAM REPORT: CPT | Performed by: INTERNAL MEDICINE

## 2025-01-20 PROCEDURE — 999N000157 HC STATISTIC RCP TIME EA 10 MIN

## 2025-01-20 PROCEDURE — 94799 UNLISTED PULMONARY SVC/PX: CPT

## 2025-01-20 PROCEDURE — 82565 ASSAY OF CREATININE: CPT

## 2025-01-20 PROCEDURE — 93005 ELECTROCARDIOGRAM TRACING: CPT

## 2025-01-20 RX ADMIN — HYDROCHLOROTHIAZIDE 50 MG: 25 TABLET ORAL at 09:37

## 2025-01-20 RX ADMIN — LOSARTAN POTASSIUM 75 MG: 50 TABLET, FILM COATED ORAL at 09:37

## 2025-01-20 RX ADMIN — CETIRIZINE HYDROCHLORIDE 10 MG: 10 TABLET, FILM COATED ORAL at 09:37

## 2025-01-20 RX ADMIN — IPRATROPIUM BROMIDE AND ALBUTEROL SULFATE 3 ML: .5; 3 SOLUTION RESPIRATORY (INHALATION) at 12:24

## 2025-01-20 RX ADMIN — BUPROPION HYDROCHLORIDE 150 MG: 150 TABLET, EXTENDED RELEASE ORAL at 09:37

## 2025-01-20 RX ADMIN — FLUTICASONE FUROATE AND VILANTEROL TRIFENATATE 1 PUFF: 100; 25 POWDER RESPIRATORY (INHALATION) at 09:38

## 2025-01-20 RX ADMIN — IPRATROPIUM BROMIDE AND ALBUTEROL SULFATE 3 ML: .5; 3 SOLUTION RESPIRATORY (INHALATION) at 07:32

## 2025-01-20 ASSESSMENT — ACTIVITIES OF DAILY LIVING (ADL)
ADLS_ACUITY_SCORE: 79
ADLS_ACUITY_SCORE: 79
ADLS_ACUITY_SCORE: 78
ADLS_ACUITY_SCORE: 79
ADLS_ACUITY_SCORE: 78
ADLS_ACUITY_SCORE: 79
ADLS_ACUITY_SCORE: 78
ADLS_ACUITY_SCORE: 79
ADLS_ACUITY_SCORE: 78
ADLS_ACUITY_SCORE: 79
ADLS_ACUITY_SCORE: 79
ADLS_ACUITY_SCORE: 78
ADLS_ACUITY_SCORE: 79
ADLS_ACUITY_SCORE: 78

## 2025-01-20 NOTE — PLAN OF CARE
Report given to EMS and pt transported back to facility by stretcher.     Problem: Adult Inpatient Plan of Care  Goal: Optimal Comfort and Wellbeing  Outcome: Adequate for Care Transition     Problem: Gas Exchange Impaired  Goal: Optimal Gas Exchange  Outcome: Adequate for Care Transition   Goal Outcome Evaluation:

## 2025-01-20 NOTE — PROGRESS NOTES
Physical Therapy Discharge Summary    Reason for therapy discharge:    Discharged to [facility]/memory care facility    Progress towards therapy goal(s). See goals on Care Plan in [facility] electronic health record for goal details.  Goals not met.  Barriers to achieving goals:   discharge from facility.    Therapy recommendation(s):    Continued therapy is recommended.  Rationale/Recommendations:  Recommend home PT to progress all functional mobility and activity tolerance.

## 2025-01-20 NOTE — DISCHARGE SUMMARY
"[facility]  Discharge Summary - Medicine & Pediatrics       Date of Admission:  1/14/2025  Date of Discharge:  1/20/2025  Discharging Provider: Dr. Letty Swain, Dr. Alicia  Discharge Service: Hospitalist Service    Discharge Diagnoses   Bronchiectasis with acute exacerbation (H)  Hypoxia  Acute renal failure, resolved  Dyspnea, unspecified type    Clinically Significant Risk Factors     # Overweight: Estimated body mass index is 27.73 kg/m  as calculated from the following:    Height as of this encounter: 1.803 m (5' 11\").    Weight as of this encounter: 90.2 kg (198 lb 13.7 oz).       Follow-ups Needed After Discharge    Follow Up and recommended labs and tests:      Follow up with Nursing home physician.  No follow up labs or test are needed.         Medication changes this admission: None    Unresulted labs from this admission: None    Discharge Disposition   Discharged to long-term care facility  Condition at discharge: Stable    Hospital Course   Stephanie Acuna is a 74 year old female admitted on 1/14/2025. She she has a history of chronic bronchiectasis, asthma, allergies, hypertension, DVT, and dementia, and was admitted for AHRF 2/2 asthma and bronchiectasis exacerbations.      The following problems were addressed during her hospitalization:    Acute hypoxic respiratory failure secondary to asthma exacerbation, improving  Chronic bronchiectasis exacerbation, improving  Patient has diagnosis of asthma given an IgE level greater than 400 as well as peripheral eosinophilia seen back in 2018 (no formal PFTs). She also has chronic bronchiectasis that has had flareups requiring steroids in the past. Normal BNP and WBC on admission. Viral panel negative. CT findings with bronchiectasis throughout and mucous plugging. CT PE negative. VBG without CO2 retention. Treated as bronchiectasis flare with 5 day course of Augmentin + Azithromycin + prednisone (1/14-1/18). Patient significantly " improved on the day of discharge.   - Home O2 at 2 LPM with rest and activity, keep O2 sats >90%  - PTA symbicort     Oxygen Documentation  I certify that this patient, Stephanie Acuna has been under my care (or a nurse practitioner or physican's assistant working with me). This is the face-to-face encounter for oxygen medical necessity.      At the time of this encounter, I have reviewed the qualifying testing and have determined that supplemental oxygen is reasonable and necessary and is expected to improve the patient's condition in a home setting.         Patient has continued oxygen desaturation due to Bronchiectasis J47.9.    If portability is ordered, is the patient mobile within the home? yes    Was this visit performed as a telehealth visit: No      Consultations This Hospital Stay   PHYSICAL THERAPY ADULT IP CONSULT  OCCUPATIONAL THERAPY ADULT IP CONSULT  CARE MANAGEMENT / SOCIAL WORK IP CONSULT    Code Status   No CPR- Do NOT Intubate       The patient was discussed with Dr. Maral Swain MD  [address] [address]  Phone: [phone]  Fax: [phone]  ______________________________________________________________________    Physical Exam   Vital Signs: Temp: 98.3  F (36.8  C) Temp src: Oral BP: 120/69 Pulse: 76   Resp: 16 SpO2: 91 % O2 Device: Nasal cannula Oxygen Delivery: 2 LPM  Weight: 198 lbs 13.68 oz  GENERAL: Awake, alert, No acute distress.   HEENT: No scleral icterus or conjunctival injection.  SKIN: Warm and dry. No bruises, rashes, or skin lesions.  LUNGS: Normal work of breathing with no use of accessory muscles.  Clear to auscultation bilaterally.  CARDIAC: Regularly irregular rhythm, regular rate. Systolic murmur. No peripheral edema.  ABDOMEN: Soft, non-tender, non-distended.   NEUROLOGIC: Alert. Pleasant affect.      Primary Care Physician   Jing Tavarez    Discharge Orders      General info for  SNF    Length of Stay Estimate: Long Term Care  Condition at Discharge: Stable  Level of care:skilled   Rehabilitation Potential: Good  Admission H&P remains valid and up-to-date: Yes  Recent Chemotherapy: N/A  Use Nursing Home Standing Orders: Yes     Follow Up and recommended labs and tests    Follow up with Nursing home physician.  No follow up labs or test are needed.     Reason for your hospital stay    You were hospitalized for an exacerbation of chronic bronchiectasis. You were treated with antibiotics and steroids. You will benefit from continuing to use a small amount of supplemental oxygen for awhile until your lungs fully recover. I have placed an order for this so that you can have it with  you when you return to your living facility.     Follow up with your nursing home physician within 1 week.     Activity - Up with nursing assistance     Oxygen (SNF/TCU) Discharge     Fall precautions     Home Oxygen Order for DME - ONLY FOR DME    Oxygen Documentation  I certify that this patient, Stephanie Acuna has been under my care (or a nurse practitioner or physican's assistant working with me). This is the face-to-face encounter for oxygen medical necessity.      At the time of this encounter, I have reviewed the qualifying testing and have determined that supplemental oxygen is reasonable and necessary and is expected to improve the patient's condition in a home setting.         Patient has continued oxygen desaturation due to Bronchiectasis J47.9.    If portability is ordered, is the patient mobile within the home? yes    Was this visit performed as a telehealth visit: No     Diet    Follow this diet upon discharge: Current Diet:Orders Placed This Encounter      Combination Diet Regular Diet Adult       Significant Results and Procedures   Results for orders placed or performed during the hospital encounter of 01/14/25   CT Chest Pulmonary Embolism w Contrast    Narrative    EXAM: CT CHEST PULMONARY  EMBOLISM W CONTRAST  LOCATION: [facility]  DATE: 01/14/2025    INDICATION: SOB, cough, negative CXR.  COMPARISON: None.  TECHNIQUE: CT chest pulmonary angiogram during arterial phase injection of IV contrast. Multiplanar reformats and MIP reconstructions were performed. Dose reduction techniques were used.   CONTRAST: Isovue 370, 75 mL.    FINDINGS:  ANGIOGRAM CHEST: Pulmonary arteries are normal caliber and negative for pulmonary emboli. Thoracic aorta is negative for dissection. No CT evidence of right heart strain.    LUNGS AND PLEURA: Bronchiolar wall thickening in the lung bases with mild regions of bronchiectasis in the lungs. Segmental elevation of the right hemidiaphragm. There is compressive consolidation in the right middle and lower lobes. Very minimal patchy   infiltrate within the lingula. Regions of mucous plugging in the lung bases.    MEDIASTINUM/AXILLAE: Normal.    CORONARY ARTERY CALCIFICATION: Moderate.    UPPER ABDOMEN: Normal.    MUSCULOSKELETAL: Mild wedge deformity of the midthoracic vertebral body.      Impression    IMPRESSION:  1.  No pulmonary embolism.    2.  Mild bronchiectasis in the lungs with bronchiolar wall thickening, predominantly in the mid and lower lungs, and regions of mucous plugging in the lung bases.       Discharge Medications   Current Discharge Medication List        CONTINUE these medications which have NOT CHANGED    Details   acetaminophen (TYLENOL) 500 MG tablet Take 1,000 mg by mouth 3 times daily as needed for pain.      albuterol (PROAIR HFA/PROVENTIL HFA/VENTOLIN HFA) 108 (90 Base) MCG/ACT inhaler Inhale 1 puff into the lungs every 4 hours as needed for shortness of breath or wheezing.      buPROPion (WELLBUTRIN XL) 150 MG 24 hr tablet Take 150 mg by mouth every morning.      camphor-menthol (DERMASARRA) 0.5-0.5 % external lotion Apply topically every morning. Apply to back externally in the morning and also as needed for itching.       cetirizine (ZYRTEC) 10 MG tablet Take 1 tablet by mouth every morning.      donepezil (ARICEPT) 10 MG tablet Take 10 mg by mouth at bedtime.      hydrochlorothiazide (HYDRODIURIL) 25 MG tablet Take 50 mg by mouth every morning.      losartan (COZAAR) 50 MG tablet Take 1.5 tablets by mouth every morning.      Multiple Vitamins-Minerals (CENTRUM SILVER ADULT 50+) TABS Take 1 tablet by mouth every morning.      Multiple Vitamins-Minerals (PRESERVISION AREDS) CAPS Take 1 capsule by mouth every morning.      SYMBICORT 160-4.5 MCG/ACT Inhaler Inhale 2 puffs into the lungs 2 times daily.           Allergies   Allergies   Allergen Reactions    Amlodipine Swelling    Escitalopram Other (See Comments)     Other Reaction(s): Insomnia/personailty changes    And personality changes    Loratadine      Other Reaction(s): Very dry    Sulfa Antibiotics Unknown

## 2025-01-20 NOTE — PLAN OF CARE
Patient A&Ox1, VSS on 2L via NC. Productive cough. Home o2 delivered today. Discharge at [id number] back to memory care    IV access: none    Pain 0/10, PRNs used: No    Ambulates assist of 1    Discharge plan: Memory care    [facility] Admission: 2- Bed Activities  HL Daily6- Walk 10 steps or more         Problem: Adult Inpatient Plan of Care  Goal: Optimal Comfort and Wellbeing  Outcome: Progressing   Goal Outcome Evaluation:

## 2025-01-20 NOTE — PROGRESS NOTES
BRIEF SOCIAL WORK NOTE:    Received update from CM team that pt is medcaially ready to discharge and facility is refusing as transport is [phone] and RN leaves at 1600.     1222: GEORGES called RN Cell: [phone], and spoke with Laisha. GEORGES discussed patient having a discharge order and being ready to leave the facility. Laisha states they do not have a RN that late. GEORGES stated BOB's are required to have a RN 24/7. Laisha stated not at an BOB. GEORGES shared that they are required to be available (on-call or in person) but that the patient has the right to return. Laisha stated it would not be safe if pt returned with no RN. GEORGES stated it is the facilities responsibility to have the staffing for when pt is ready to return. Laisha stated that it is not safe if they do not have the staff. GEORGES shared it is the law that patient's have the right to return to their long-term's when they are ready to discharge from a hospital. Laisha stated they have no seen any notes on the patient and GEORGES stated GEORGES was happy to send whatever would be helpful. Laisha requested therapy notes, med list and progress notes. Laisha stated she would review and call SW back. Laisha did state she faxed over a relocation notice. GEORGES inquired if they were evicting patient and Laisha stated no.     GEORGES faxed H&P, progress notes, PT/OT notes, and discharge orders and summary.     Fax: [phone]    Relocation notice has not been received.     1245: GEORGES received call from Laisha. She reviewed orders and just clarified O2. GEORGES stated that it should arrive prior to pt and transport will provide for ride. Pt is able to discharge today with current ride time.     Dipika So, [facility]

## 2025-01-20 NOTE — CARE PLAN
01/20/25 1000   Home Oxygen Assessment (RN/RT ONLY)   Does patient have oxygen at home? No   1. SpO2 on room air at rest while awake 88       Oxygen LPM at rest 2   3. SpO2 on room air during Activity/with exercise 89   4. SpO2 with oxygen during activity/with exercise 92       Oxygen LPM during activity/with exercise 2   Does patient qualify for Home O2? Yes

## 2025-01-20 NOTE — PLAN OF CARE
Problem: Adult Inpatient Plan of Care  Goal: Absence of Hospital-Acquired Illness or Injury  Intervention: Identify and Manage Fall Risk  Recent Flowsheet Documentation  Taken 1/19/2025 2125 by Katy Kendrick, RN  Safety Promotion/Fall Prevention:   safety round/check completed   room organization consistent   nonskid shoes/slippers when out of bed   lighting adjusted     Problem: Gas Exchange Impaired  Goal: Optimal Gas Exchange  Outcome: Progressing  Intervention: Optimize Oxygenation and Ventilation  Recent Flowsheet Documentation  Taken 1/19/2025 2123 by Katy Kendrick RN  Head of Bed (HOB) Positioning: HOB at 20-30 degrees     Problem: Adult Inpatient Plan of Care  Goal: Optimal Comfort and Wellbeing  Outcome: Progressing   Goal Outcome Evaluation:    Pt alert, disoriented to time, place, and situation. VSS at this time on RA. Denies pain through shift. Assist x1-2, GB, walker. Bed in low, alarm on for safety. Plan to discharge back to [facility].

## 2025-01-20 NOTE — PROGRESS NOTES
Care Management Discharge Note    Discharge Date: 01/20/2025       Discharge Disposition:  [facility]    Discharge Services: Transportation Services    Discharge DME: Oxygen (per bedside RN)    Discharge Transportation: agency    Private pay costs discussed: transportation costs    Education Provided on the Discharge Plan: Yes (AVS per bedside RN)    Persons Notified of Discharge Plans: patient and daughter    Patient/Family in Agreement with the Plan: yes         Additional Information:  CM reviewed chart. CM met with patient and daughter (bedside).   CM called to update nurse that patient will discharge today. CM confirmed the address for facility.  Nurse Cell [phone] and fax [phone].       CM met with patient and daughter bedside. Daughter is requesting CM arrange transportation back to [facility]. Patient will need stretcher due to dementia and [address] stretcher with a pickup time between 3:25 pm - 4:10 pm. CM discussed with daughter (bedside) that patient may be billed for transportation and she was agreeable.       CM sent discharge orders to [facility].  12:11 PM       CM called [facility]. CM spoke to Katy the nurse to update that discharge orders have been sent and notify of the transportation time.  time is about 4 pm. Katy said that won't work. Call given to the Director of Nursing. Director of Nursing is not agreeable to discharge back at 4 pm. Director of Nursing wants patient to discharge any day this week prior to noon. Director of Nursing said she told someone on Friday that they would not take her back until Monday and prior to noon. CM said the patient has been  medically ready to discharge since Friday. Director of Nursing said [facility] is an assisted living and its' not safe for the patient to return without a nurse being in the building since patient has been gone more that 24  hours. CM brought up the law and that patient has a right to return. Director of Nursing said the facility has a relocation letter for the patient. Director of Nursing said she will fax it to . CM will be notifying the budsman. 12:20 PM       CM called [facility]. The Office is closed due to the holiday. CM left  requesting a return call. 12:30 PM       CM updated bedside RN and charge RN that 02 needs to be at the facility prior to discharge. 12:49 PM     CM called [facility] for home 02 to verify delivery time. Home 02 needs to be delivered by 3 pm. Needs to be delivered prior to patient discharge. 12:53 PM   CM confirmed the delivery address for home 02. 1:08 PM   Phone [phone]      CM still has not received the relocation letter from [facility]. Per earlier discussion with the Director of Nursing said she was going to fax it to . 1:31 PM     CM received a VM from [place] [facility] [phone]. The message stated that staff should call [facility] [phone] once the patient has been discharged so the can go to the assisted living and do a set up.   RADHA called [place] [phone] earlier today and discussed that the 02 needed to be delivered to [facility] and provided the address. Patient lives in memory care and the 02 should be delivered prior to patient  discharge. 1:47 PM         Ruby with [facility] returned call about the home 02.  Ruby said the 02 has been delivered to the hospital. Ruby said the home 02 will be delivered to [facility] within the next couple hours. RADHA gave Ruby the address.  1:57 PM             Alyssa Henson, NOLVIA  Care Coordinator

## 2025-01-29 ENCOUNTER — DOCUMENTATION ONLY (OUTPATIENT)
Dept: OTHER | Facility: CLINIC | Age: 75
End: 2025-01-29
Payer: COMMERCIAL

## 2025-03-05 ENCOUNTER — HOSPITAL ENCOUNTER (INPATIENT)
Facility: CLINIC | Age: 75
DRG: 190 | End: 2025-03-05
Attending: EMERGENCY MEDICINE | Admitting: STUDENT IN AN ORGANIZED HEALTH CARE EDUCATION/TRAINING PROGRAM
Payer: COMMERCIAL

## 2025-03-05 ENCOUNTER — APPOINTMENT (OUTPATIENT)
Dept: RADIOLOGY | Facility: CLINIC | Age: 75
DRG: 190 | End: 2025-03-05
Attending: EMERGENCY MEDICINE
Payer: COMMERCIAL

## 2025-03-05 ASSESSMENT — ACTIVITIES OF DAILY LIVING (ADL)
ADLS_ACUITY_SCORE: 60
ADLS_ACUITY_SCORE: 68
ADLS_ACUITY_SCORE: 60
ADLS_ACUITY_SCORE: 68
ADLS_ACUITY_SCORE: 60
ADLS_ACUITY_SCORE: 68
ADLS_ACUITY_SCORE: 60
ADLS_ACUITY_SCORE: 68

## 2025-03-05 ASSESSMENT — ENCOUNTER SYMPTOMS
FEVER: 0
COUGH: 1
SHORTNESS OF BREATH: 1
VOMITING: 0

## 2025-03-05 NOTE — ED PROVIDER NOTES
EMERGENCY DEPARTMENT ENCOUNTER      NAME: Stephanie Acuna  AGE: 74 year old female  YOB: 1950  MRN: [id number]  EVALUATION DATE & TIME: 3/5/2025 10:44 AM    PCP: Jing Tavarez    ED PROVIDER: Jacqui Lorenzo DO      Chief Complaint   Patient presents with    Breathing Problem         FINAL IMPRESSION:  1. Bronchiectasis with acute exacerbation (H)          ED COURSE & MEDICAL DECISION MAKIN-year-old female with history of bronchiectasis who has been on supplemental oxygen after being admitted to the hospital a little over 1 month ago for bronchiectasis exacerbation was sent into the ED from her skilled facility for worsening dyspnea and wheezing.  The report was that the patient was hypoxic with O2 sats in the upper 80s on the supplemental oxygen while ambulating in the facility.  Upon arrival to the ED the patient's O2 sats were in the upper 90s.  However, the patient was quite tachypneic.  On exam the patient was noted to have mild respiratory distress with tachypnea, increased work of breathing, and diffuse wheezing noted bilaterally.      Following initial evaluation the patient was given an albuterol nebulizer breathing treatment as well as IV methylprednisolone.    An EKG was obtained which revealed normal sinus rhythm without any concerning ST or T wave changes.    CBC, BMP, and magnesium were all reassuring.  BNP was unremarkable and the patient's troponin was slightly elevated at 27.      Testing for COVID and influenza are both negative.  Chest x-ray was nondiagnostic.    Repeat troponin was unchanged.    The patient was reevaluated and both she and her daughter were informed of the lab and imaging results.  They were informed that the patient symptoms likely represent a repeat bronchiectasis exacerbation.  The patient was started on oral Levaquin to treat the bronchiectasis.  At the time of reevaluation the patient's work of breathing appeared to have improved slightly.     The  patient was admitted to the resident service for further evaluation and treatment of the bronchiectasis exacerbation.    Pertinent Labs & Imaging studies reviewed. (See chart for details)  11:05 AM I met with the patient to gather history and to perform my initial exam. We discussed plans for the ED course, including diagnostic testing and treatment.      At the conclusion of the encounter I discussed the results of all of the tests and the disposition. The questions were answered. The patient or family acknowledged understanding and was agreeable with the care plan.     Medical Decision Making    History:  Supplemental history from: Documented in chart  External Record(s) reviewed: Documented in chart    Work Up:  Chart documentation includes differential considered and any EKGs or imaging independently interpreted by provider, where specified.  In additional to work up documented, I considered the following work up: Documented in chart, if applicable.    External consultation:  Discussion of management with another provider: Documented in chart, if applicable    Complicating factors:  Care impacted by chronic illness: Documented in Chart  Care affected by social determinants of health: N/A    Disposition considerations: Admit.    Not Applicable        PPE worn: n95 mask, goggles    MEDICATIONS GIVEN IN THE EMERGENCY:  Medications   albuterol (PROVENTIL) neb solution 5 mg (5 mg Nebulization $Given 3/5/25 1142)   methylPREDNISolone Na Suc (solu-MEDROL) injection 125 mg (125 mg Intravenous $Given 3/5/25 1156)   albuterol (PROVENTIL) neb solution 5 mg (5 mg Nebulization $Given 3/5/25 1436)   levofloxacin (LEVAQUIN) tablet 500 mg (500 mg Oral $Given 3/5/25 1449)       NEW PRESCRIPTIONS STARTED AT TODAY'S ER VISIT  New Prescriptions    No medications on file          =================================================================    HPI    Patient information was obtained from: Patient, EMS    Use of :  "N/A        Stephanie Acuna is a 74 year old female with a pertinent history of chronic bronchiectasis, asthma, HTN, DVT history and dementia, who presents to this ED via ambulance for evaluation of shortness of breath.    Per EMS, the patient is coming from her [facility] care [facility] living facility [facility] Staff at the living facility called EMS as the patient has increased SOB/audible wheezing for last 3-4 days, her symptoms worsened today. Staff at residence administered ipratropium, and EMS administered albuterol. At baseline, she is on 2lpm nasal cannula oxygen since having bronchitis about 1 month ago. She started prednisone today. Baseline mentation of \"yes/no\" answers. It was noted she is disoriented to place, time. She is wheelchair bound per EMS, with assist of 1-2 caregivers to transfer.    Per RN, the patient's O2 saturations dropped down to 88-90% while she was ambulating, but she was still on 2lpm nasal cannula.    Patient reports she's been feeling short of breath ongoing for the past 1 to 2 days. She endorses having some chest tightness with this. She also has a cough. Denies having any fevers. No episodes of emesis. States that her legs feel more swollen than usual. No other reported complaints or concerns at this time.      REVIEW OF SYSTEMS   Review of Systems   Constitutional:  Negative for fever.   Respiratory:  Positive for cough and shortness of breath (increased).    Cardiovascular:  Positive for leg swelling.        Positive for chest tightness   Gastrointestinal:  Negative for vomiting.   All other systems reviewed and are negative.      PAST MEDICAL HISTORY:  No past medical history on file.    PAST SURGICAL HISTORY:  Past Surgical History:   Procedure Laterality Date    HYSTERECTOMY  Early 1990's    OOPHORECTOMY Bilateral Early 1990's           CURRENT MEDICATIONS:    acetaminophen (TYLENOL) 500 MG tablet  albuterol (PROAIR HFA/PROVENTIL HFA/VENTOLIN HFA) 108 (90 Base) MCG/ACT " inhaler  albuterol (PROVENTIL) (2.5 MG/3ML) 0.083% neb solution  budesonide (PULMICORT) 0.25 MG/2ML neb solution  buPROPion (WELLBUTRIN XL) 150 MG 24 hr tablet  cetirizine (ZYRTEC) 10 MG tablet  donepezil (ARICEPT) 10 MG tablet  guaiFENesin (MUCINEX) 600 MG 12 hr tablet  hydrochlorothiazide (HYDRODIURIL) 25 MG tablet  ipratropium - albuterol 0.5 mg/2.5 mg/3 mL (DUONEB) 0.5-2.5 (3) MG/3ML neb solution  ipratropium - albuterol 0.5 mg/2.5 mg/3 mL (DUONEB) 0.5-2.5 (3) MG/3ML neb solution  losartan (COZAAR) 50 MG tablet  Multiple Vitamins-Minerals (CENTRUM SILVER ADULT 50+) TABS  Multiple Vitamins-Minerals (PRESERVISION AREDS) CAPS  pramoxine (PRAX) 1 % LOTN lotion  pramoxine (PRAX) 1 % LOTN lotion  predniSONE (DELTASONE) 20 MG tablet        ALLERGIES:  Allergies   Allergen Reactions    Amlodipine Swelling    Escitalopram Other (See Comments)     Other Reaction(s): Insomnia/personailty changes    And personality changes    Loratadine      Other Reaction(s): Very dry    Sulfa Antibiotics Unknown       FAMILY HISTORY:  Family History   Problem Relation Age of Onset    Ovarian Cancer Maternal Grandmother        SOCIAL HISTORY:   Social History     Socioeconomic History    Marital status: Single     Social Drivers of Health     Financial Resource Strain: Low Risk  (2/1/2025)    Received [facility]    Financial Resource Strain     Difficulty of Paying Living Expenses: 3   Food Insecurity: Low Risk  (1/20/2025)    Food Insecurity     Within the past 12 months, did you worry that your food would run out before you got money to buy more?: No     Within the past 12 months, did the food you bought just not last and you didn t have money to get more?: No   Transportation Needs: Low Risk  (1/20/2025)    Transportation Needs     Within the past 12 months, has lack of transportation kept you from medical appointments, getting your medicines, non-medical meetings or appointments, work, or from  "getting things that you need?: No   Social Connections: Socially Integrated (7/14/2024)    Received from [facility]    Social Connections     Do you often feel lonely or isolated from those around you?: 0   Interpersonal Safety: Low Risk  (1/16/2025)    Interpersonal Safety     Do you feel physically and emotionally safe where you currently live?: Yes     Within the past 12 months, have you been hit, slapped, kicked or otherwise physically hurt by someone?: No     Within the past 12 months, have you been humiliated or emotionally abused in other ways by your partner or ex-partner?: No   Housing Stability: Low Risk  (1/20/2025)    Housing Stability     Do you have housing? : Yes     Are you worried about losing your housing?: No       VITALS:  BP (!) 144/89   Pulse 79   Temp 97.5  F (36.4  C) (Oral)   Resp 21   Ht 1.803 m (5' 11\")   Wt 89.8 kg (198 lb)   SpO2 96%   BMI 27.62 kg/m      PHYSICAL EXAM    General presentation: Alert, Vital signs reviewed. In mild acute distress.  HENT: ENT inspection is normal. Oropharynx is moist and clear.   Eye: Pupils are equal and reactive to light. EOMI  Neck: The neck is supple, with full ROM, with no evidence of meningismus.  Pulmonary: Currently in no acute respiratory distress. She has bilateral expiratory wheezing noted. Some crackles in the left base. Tachypneic with slight increased work of breathing noted. Non labored respirations.  Circulatory: Regular rate and rhythm. Peripheral pulses are strong and equal. No murmurs, rubs, or gallops.   Abdominal: The abdomen is soft. Nontender. No rigidity, guarding, or rebound. Bowel sounds normal.   Neurologic: Alert, oriented to person, place, and time. No motor deficit. No sensory deficit. Cranial nerves II through XII are intact.  Musculoskeletal: No extremity tenderness. Full range of motion in all extremities. No extremity edema.   Skin: Skin color is normal. No rash. Warm. Dry to touch. "      LAB:  All pertinent labs reviewed and interpreted.  Results for orders placed or performed during the hospital encounter of 03/05/25   XR Chest Port 1 View    Impression    IMPRESSION:     Elevated right hemidiaphragm. Right greater than left bibasilar atelectasis and/or scarring. Lungs are otherwise clear. No pleural effusions or pneumothorax. Normal pulmonary vascularity.    Nonenlarged cardiac silhouette.    Old healed fractures of the left posterolateral 5th-7th ribs. Mild scoliosis.   Result Value Ref Range    INR 1.02 0.85 - 1.15   Partial thromboplastin time   Result Value Ref Range    aPTT 25 22 - 38 Seconds   Comprehensive metabolic panel   Result Value Ref Range    Sodium 138 135 - 145 mmol/L    Potassium 4.2 3.4 - 5.3 mmol/L    Carbon Dioxide (CO2) 25 22 - 29 mmol/L    Anion Gap 14 7 - 15 mmol/L    Urea Nitrogen 16.0 8.0 - 23.0 mg/dL    Creatinine 0.94 0.51 - 0.95 mg/dL    GFR Estimate 63 >60 mL/min/1.73m2    Calcium 9.6 8.8 - 10.4 mg/dL    Chloride 99 98 - 107 mmol/L    Glucose 169 (H) 70 - 99 mg/dL    Alkaline Phosphatase 85 40 - 150 U/L    AST 29 0 - 45 U/L    ALT 34 0 - 50 U/L    Protein Total 6.8 6.4 - 8.3 g/dL    Albumin 4.3 3.5 - 5.2 g/dL    Bilirubin Total 0.3 <=1.2 mg/dL   Result Value Ref Range    Magnesium 1.9 1.7 - 2.3 mg/dL   Result Value Ref Range    Troponin T, High Sensitivity 27 (H) <=14 ng/L   Nt probnp inpatient   Result Value Ref Range    N terminal Pro BNP Inpatient 342 0 - 1,800 pg/mL   Influenza A/B, RSV and SARS-CoV2 PCR (COVID-19) Nasopharyngeal    Specimen: Nasopharyngeal; Swab   Result Value Ref Range    Influenza A PCR Negative Negative    Influenza B PCR Negative Negative    RSV PCR Negative Negative    SARS CoV2 PCR Negative Negative   CBC with platelets and differential   Result Value Ref Range    WBC Count 8.2 4.0 - 11.0 10e3/uL    RBC Count 3.84 3.80 - 5.20 10e6/uL    Hemoglobin 12.2 11.7 - 15.7 g/dL    Hematocrit 36.3 35.0 - 47.0 %    MCV 95 78 - 100 fL    MCH 31.8  26.5 - 33.0 pg    MCHC 33.6 31.5 - 36.5 g/dL    RDW 13.2 10.0 - 15.0 %    Platelet Count 282 150 - 450 10e3/uL    % Neutrophils 90 %    % Lymphocytes 7 %    % Monocytes 2 %    % Eosinophils 2 %    % Basophils 1 %    % Immature Granulocytes 0 %    NRBCs per 100 WBC 0 <1 /100    Absolute Neutrophils 7.3 1.6 - 8.3 10e3/uL    Absolute Lymphocytes 0.6 (L) 0.8 - 5.3 10e3/uL    Absolute Monocytes 0.1 0.0 - 1.3 10e3/uL    Absolute Eosinophils 0.1 0.0 - 0.7 10e3/uL    Absolute Basophils 0.1 0.0 - 0.2 10e3/uL    Absolute Immature Granulocytes 0.0 <=0.4 10e3/uL    Absolute NRBCs 0.0 10e3/uL   Result Value Ref Range    Troponin T, High Sensitivity 24 (H) <=14 ng/L   ECG 12-LEAD WITH MUSE (LHE)   Result Value Ref Range    Systolic Blood Pressure  mmHg    Diastolic Blood Pressure  mmHg    Ventricular Rate 90 BPM    Atrial Rate 90 BPM    AZ Interval 164 ms    QRS Duration 76 ms     ms    QTc 474 ms    P Axis 48 degrees    R AXIS 75 degrees    T Axis 60 degrees    Interpretation ECG       Sinus rhythm with sinus arrhythmia  Normal ECG  When compared with ECG of 20-Jan-2025 11:37,  No significant change was found  Confirmed by SEE ED PROVIDER NOTE FOR, ECG INTERPRETATION ([id number]),  Ginny Morales ([id number]) on 3/5/2025 11:00:53 AM         RADIOLOGY:  Reviewed all pertinent imaging. Please see official radiology report.  XR Chest Port 1 View   Final Result   IMPRESSION:       Elevated right hemidiaphragm. Right greater than left bibasilar atelectasis and/or scarring. Lungs are otherwise clear. No pleural effusions or pneumothorax. Normal pulmonary vascularity.      Nonenlarged cardiac silhouette.      Old healed fractures of the left posterolateral 5th-7th ribs. Mild scoliosis.          EKG:    Normal sinus rhythm.  Rate of 90.  Normal QRS.  Normal QT.  No ST or T wave changes.  Compared to EKG on 1/20/2025 no significant changes are noted    I have independently reviewed and interpreted the EKG(s) documented  above.          I, Kerry Brooke , am serving as a scribe to document services personally performed by Jacqui Lorenzo DO based on my observation and the provider's statements to me. I, Jacqui Lorenzo, attest that Kerry Brooke is acting in a scribe capacity, has observed my performance of the services and has documented them in accordance with my direction.    Jacqui Lorenzo DO  Emergency Medicine  [facility] EMERGENCY ROOM  [address] [facility] [address] [address] [phone]      Jacqui Lorenzo DO  03/05/25 7505

## 2025-03-05 NOTE — ED TRIAGE NOTES
"Patient presents from [facility] care assisted living [facility]". Staff called EMS as patient has increased SOB/audible wheezing for past 3-4 dumont, worse today. Staff at residence administered ipratropium, EMS administered albuterol. Baseline 2lpm nasal cannula since having bronchitis about 1 month ago. Started  prednisone today. Baseline mentation of \"yes/no\" answers. Patient disoriented to place, time. Wheelchair bound per EMS, with assist of 1-2 caregivers to transfer.      Triage Assessment (Adult)       Row Name 03/05/25 1050          Triage Assessment    Airway WDL X     Additional Documentation Breath Sounds (Group)        Respiratory WDL    Respiratory WDL X;rhythm/pattern     Rhythm/Pattern, Respiratory labored;tachypneic;pattern regular        Breath Sounds    Breath Sounds All Fields     All Lung Fields Breath Sounds rhonchi        Skin Circulation/Temperature WDL    Skin Circulation/Temperature WDL X  pallor        Cardiac WDL    Cardiac WDL X  HTN        Peripheral/Neurovascular WDL    Peripheral Neurovascular WDL WDL        Cognitive/Neuro/Behavioral WDL    Cognitive/Neuro/Behavioral WDL X     Level of Consciousness confused;alert     Arousal Level opens eyes spontaneously     Orientation disoriented to;place;time     Speech spontaneous                     "

## 2025-03-05 NOTE — PHARMACY-ADMISSION MEDICATION HISTORY
Pharmacist Admission Medication History    Admission medication history is complete. The information provided in this note is only as accurate as the sources available at the time of the update.    Information Source(s): Facility ([facility]/NH/) medication list/MAR via N/A, [facility].    Pertinent Information: Started on 5 day prednisone burst, Mucinex, and neb treatments today, 3/5/25.    Changes made to PTA medication list:  Added: Sarna sensitive, both scheduled + PRN; Duoneb, both scheduled + PRN; Mucinex, albuterol neb PRN, budesonide 0.25mg/2ml neb scheduled, prednisone 5 day course  Deleted: camphor-menthol Dermasarra 0.5-0.5% lotion, Symbicort 160--4.5 mcg/act inhaler   Changed: hydrochlorothiazide 25 mg from 2 tabs (50 mg) to 1 tab (25 mg) dose, once daily; losartan 50 mg from 1.5 tabs (75mg) to 1 tab (50 mg) dose, once daily    Allergies reviewed with patient and updates made in EHR: unable to assess, amlodipine, escitalopram, and loratadine on MAR, cannot confirm sulfa antibiotics.    Medication History Completed By: Sherley Peraza RPH 3/5/2025 3:24 PM    PTA Med List   Medication Sig Note Last Dose/Taking    acetaminophen (TYLENOL) 500 MG tablet Take 1,000 mg by mouth 3 times daily as needed for pain.  Past Month    albuterol (PROAIR HFA/PROVENTIL HFA/VENTOLIN HFA) 108 (90 Base) MCG/ACT inhaler Inhale 1 puff into the lungs every 4 hours as needed for shortness of breath or wheezing.  Past Month    albuterol (PROVENTIL) (2.5 MG/3ML) 0.083% neb solution Take 2.5 mg by nebulization every 4 hours as needed for wheezing (congestion).  3/4/2025 at 12:49 PM    budesonide (PULMICORT) 0.25 MG/2ML neb solution Take 0.25 mg by nebulization 2 times daily. 3/5/2025: New order, active as of 3/5/25. Unknown    buPROPion (WELLBUTRIN XL) 150 MG 24 hr tablet Take 150 mg by mouth every morning.  3/5/2025 at  7:51 AM    cetirizine (ZYRTEC) 10 MG tablet Take 1 tablet by mouth every morning.  3/5/2025 at  7:51 AM     donepezil (ARICEPT) 10 MG tablet Take 10 mg by mouth at bedtime.  3/4/2025 at  7:46 PM    guaiFENesin (MUCINEX) 600 MG 12 hr tablet Take 600 mg by mouth 2 times daily as needed for congestion.  Unknown    hydrochlorothiazide (HYDRODIURIL) 25 MG tablet Take 25 mg by mouth every morning.  3/5/2025 at  7:51 AM    ipratropium - albuterol 0.5 mg/2.5 mg/3 mL (DUONEB) 0.5-2.5 (3) MG/3ML neb solution Take 1 vial by nebulization 3 times daily. 3/5/2025: New order, active as of 3/5/25. 3/5/2025 at  8:10 AM    ipratropium - albuterol 0.5 mg/2.5 mg/3 mL (DUONEB) 0.5-2.5 (3) MG/3ML neb solution Take 1 vial by nebulization 2 times daily as needed for wheezing (congestion). 3/5/2025: New order, active as of 3/5/25. Unknown    losartan (COZAAR) 50 MG tablet Take 50 mg by mouth every morning.  3/5/2025 at  7:51 AM    Multiple Vitamins-Minerals (CENTRUM SILVER ADULT 50+) TABS Take 1 tablet by mouth every morning.  3/5/2025 at  7:51 AM    Multiple Vitamins-Minerals (PRESERVISION AREDS) CAPS Take 1 capsule by mouth every morning.  3/5/2025 at  7:51 AM    pramoxine (PRAX) 1 % LOTN lotion Place rectally daily. Apply to back for itching.  3/5/2025 at  7:51 AM    pramoxine (PRAX) 1 % LOTN lotion Apply topically 2 times daily as needed for itching. Apply to back  Past Month    predniSONE (DELTASONE) 20 MG tablet Take 40 mg by mouth daily. Take x 5 days.   7:51 AM

## 2025-03-05 NOTE — ED NOTES
Bed: [facility]ED-10  Expected date:   Expected time:   Means of arrival: Ambulance  Comments:  Liz laughlin

## 2025-03-05 NOTE — H&P
[facility]    History and Physical - Hospitalist Service       Date of Admission:  3/5/2025    Assessment & Plan      Stephanie Acuna is a 74 year old female admitted on 3/5/2025. She has a history of chronic bronchiectasis, asthma, hypertension, DVT (1/19/24), dementia and is admitted for increased work of breathing secondary to exacerbation of chronic bronchiectasis.     Chronic bronchiectasis exacerbation  Increased work of breathing  Has chronic bronchiectasis that has had flareups requiring steroids in the past.  Recent hospitalization 1/14-1/20 with bronchiectasis flareup secondary to acute asthma exacerbation. This admission, increased work of breathing with belly breathing. Viral panel negative. Xray showing atelectasis. Labs overall wnl. Daughter is concerned that she has had multiple recent respiratory flare ups after reported being stable. Last followed with Pulm in 2023, no current pulmonologist.  S/p 125mg IV solumedrol and 1 dose levofloxacin in the ED    - Sputum culture  - RCAT  - Incentive spirometry  - PTA mucinex  - 60 mg methylprednisolone IV 5-day course starting with 125mg loading dose (3/5 -)  - DuoNebs every 4 hours  - D dimer    Asthma  Patient has diagnosis of asthma given an IgE level greater than 400 as well as peripheral eosinophilia seen back in 2018.   - PTA budesonide neb BID    Allergies  PTA daily cetirizine 10 mg     Hypertension  -PTA hydrochlorothiazide 25 mg daily  -PTA losartan 50 mg daily     Anxiety  -bupropion 150 mg daily     Dementia  -Hold PTA donepezil due to advised caution in patients with asthma  - Delirium prevention orders        Diet: Combination Diet Regular Diet Adult    DVT Prophylaxis: Enoxaparin (Lovenox) SQ  Rubio Catheter: Not present  Fluids: PO  Lines: None     Cardiac Monitoring: None  Code Status: No CPR- Do NOT Intubate    Clinically Significant Risk Factors Present on Admission                   # Hypertension: Home  "medication list includes antihypertensive(s)           # Overweight: Estimated body mass index is 27.62 kg/m  as calculated from the following:    Height as of this encounter: 1.803 m (5' 11\").    Weight as of this encounter: 89.8 kg (198 lb).       # Asthma: noted on problem list        Disposition Plan        The patient's care was discussed with the Attending Physician, Dr. Odom .      Arlen Pickens MD  Hospitalist Service  [facility]  Securely message with [employer] (more info)  Text page via [facility] [employer] Paging/Directory   ______________________________________________________________________    Chief Complaint   Shortness of breath, increased work of breathing    History is obtained from the patient and the patient's daughter    History of Present Illness   Stephanie Acuna is a 74 year old female who has a history of chronic bronchiectasis, asthma, HTN, DVT history and dementia and is admitted for chronic bronchiectasis exacerbation.    Had recent admission 1/14-1/20 for acute hypoxic respiratory failure secondary to asthma exacerbation and chronic bronchiectasis exacerbation, at which point she was discharged on 2L and has remained on since then. Has previously seen multiple pulmonologists, last retired in 2023 and has not seen pulmonologist since. Had been overall stable until recently.    Memory care staff called EMS due to increased shortness of breath and audible wheezing for 3-4 days, worsened today. Attempted to get prednisone but barriers to initiation so first dose today 3/5. Per ED note, facility staff administered ipratropium nebs and had tried to and EMS administered albuterol.    Patient and her daughter endorse no fevers, but increased sputum production, green in color. Patient denies any decrease in PO intake, endorses fatigue from increased effort and shortness of breath. Denies chest pain. Patient is wheelchair bound due to weakness, baseline mentation mostly yes/no to 1 " "word answers.     ED workup: Vitals stable, mildly hypertensive systolic, Llabs overall benign, delta trop negative, negative RVP  CXR revealed \"Right greater than left bibasilar atelectasis and/or scarring. Lungs are otherwise clear.\"     ED gave albuterol, started on levofloxacin, 125mg of solumedroil      Past Medical History    No past medical history on file.    Past Surgical History   Past Surgical History:   Procedure Laterality Date    HYSTERECTOMY  Early 1990's    OOPHORECTOMY Bilateral Early 1990's       Prior to Admission Medications   Prior to Admission Medications   Prescriptions Last Dose Informant Patient Reported? Taking?   Multiple Vitamins-Minerals (CENTRUM SILVER ADULT 50+) TABS 3/5/2025 at  7:51 AM  Yes Yes   Sig: Take 1 tablet by mouth every morning.   Multiple Vitamins-Minerals (PRESERVISION AREDS) CAPS 3/5/2025 at  7:51 AM  Yes Yes   Sig: Take 1 capsule by mouth every morning.   acetaminophen (TYLENOL) 500 MG tablet Past Month  Yes Yes   Sig: Take 1,000 mg by mouth 3 times daily as needed for pain.   albuterol (PROAIR HFA/PROVENTIL HFA/VENTOLIN HFA) 108 (90 Base) MCG/ACT inhaler Past Month  Yes Yes   Sig: Inhale 1 puff into the lungs every 4 hours as needed for shortness of breath or wheezing.   albuterol (PROVENTIL) (2.5 MG/3ML) 0.083% neb solution 3/4/2025 at 12:49 PM  Yes Yes   Sig: Take 2.5 mg by nebulization every 4 hours as needed for wheezing (congestion).   buPROPion (WELLBUTRIN XL) 150 MG 24 hr tablet 3/5/2025 at  7:51 AM  Yes Yes   Sig: Take 150 mg by mouth every morning.   budesonide (PULMICORT) 0.25 MG/2ML neb solution Unknown  Yes Yes   Sig: Take 0.25 mg by nebulization 2 times daily.   cetirizine (ZYRTEC) 10 MG tablet 3/5/2025 at  7:51 AM  Yes Yes   Sig: Take 1 tablet by mouth every morning.   donepezil (ARICEPT) 10 MG tablet 3/4/2025 at  7:46 PM  Yes Yes   Sig: Take 10 mg by mouth at bedtime.   guaiFENesin (MUCINEX) 600 MG 12 hr tablet Unknown  Yes Yes   Sig: Take 600 mg by " mouth 2 times daily as needed for congestion.   hydrochlorothiazide (HYDRODIURIL) 25 MG tablet 3/5/2025 at  7:51 AM  Yes Yes   Sig: Take 25 mg by mouth every morning.   ipratropium - albuterol 0.5 mg/2.5 mg/3 mL (DUONEB) 0.5-2.5 (3) MG/3ML neb solution 3/5/2025 at  8:10 AM  Yes Yes   Sig: Take 1 vial by nebulization 3 times daily.   ipratropium - albuterol 0.5 mg/2.5 mg/3 mL (DUONEB) 0.5-2.5 (3) MG/3ML neb solution Unknown  Yes Yes   Sig: Take 1 vial by nebulization 2 times daily as needed for wheezing (congestion).   losartan (COZAAR) 50 MG tablet 3/5/2025 at  7:51 AM  Yes Yes   Sig: Take 50 mg by mouth every morning.   pramoxine (PRAX) 1 % LOTN lotion 3/5/2025 at  7:51 AM  Yes Yes   Sig: Place rectally daily. Apply to back for itching.   pramoxine (PRAX) 1 % LOTN lotion Past Month  Yes Yes   Sig: Apply topically 2 times daily as needed for itching. Apply to back   predniSONE (DELTASONE) 20 MG tablet  7:51 AM  Yes Yes   Sig: Take 40 mg by mouth daily. Take x 5 days.      Facility-Administered Medications: None           Physical Exam   Vital Signs: Temp: 97.5  F (36.4  C) Temp src: Oral BP: (!) 144/89 Pulse: 79   Resp: 21 SpO2: 96 % O2 Device: Nasal cannula Oxygen Delivery: 2 LPM  Weight: 198 lbs 0 oz    PHYSICAL EXAM:  GENERAL: Awake, alert, appearing slightly fatigued, audibly noisy breathing   HEENT: No scleral icterus or conjunctival injection. Nose with NC, normal lids and lashes  SKIN: Warm and dry. No bruises, rashes, or skin lesions.  LUNGS: Increased work of breathing with belly breathing, no subcostal retractions. Loud rhonchi in all lung fields (anterior > posterior). Bibasilar expiratory wheeze in posterior lung fields. Good air entry.  CARDIAC: Cardiac exam difficult to perform due to pervasive rhonchorus breath sounds No peripheral edema, wearing compressive socks.  ABDOMEN: Non-distended. Soft and non-tender throughout with no masses or organomegaly.  NEUROLOGIC: Alert and oriented x2-3 (Stephanie,  [facility], February)  EXTREMITIES: No gross deformity or peripheral edema. Appear well-perfused.       Medical Decision Making     Please see A&P for additional details of medical decision making.      Data   ------------------------- PAST 24 HR DATA REVIEWED -----------------------------------------------    I have personally reviewed the following data over the past 24 hrs:    8.2  \   12.2   / 282     138 99 16.0 /  169 (H)   4.2 25 0.94 \     ALT: 34 AST: 29 AP: 85 TBILI: 0.3   ALB: 4.3 TOT PROTEIN: 6.8 LIPASE: N/A     Trop: 24 (H) BNP: 342     INR:  1.02 PTT:  25   D-dimer:  N/A Fibrinogen:  N/A       Imaging results reviewed over the past 24 hrs:   Recent Results (from the past 24 hours)   XR Chest Port 1 View    Narrative    EXAM: XR CHEST PORT 1 VIEW  LOCATION: [facility]  DATE: 3/5/2025    INDICATION: Shortness of breath.  COMPARISON: Chest radiograph 6/20/2024. CT chest 1/4/2025.      Impression    IMPRESSION:     Elevated right hemidiaphragm. Right greater than left bibasilar atelectasis and/or scarring. Lungs are otherwise clear. No pleural effusions or pneumothorax. Normal pulmonary vascularity.    Nonenlarged cardiac silhouette.    Old healed fractures of the left posterolateral 5th-7th ribs. Mild scoliosis.

## 2025-03-05 NOTE — ED NOTES
"[facility] ED Handoff Report    ED Chief Complaint: SOB    ED Diagnosis:  (J47.1) Bronchiectasis with acute exacerbation (H)  Comment: Tachypneic with coarse and expiratory wheezing Lung sounds. On 2 L/min NC. Sat 2 95%.  Plan: Oral antibiotic, nebs and steroids.        PMH:  No past medical history on file.     Code Status:  Prior     Falls Risk: Yes Band: Applied    Current Living Situation/Residence: From memory care. [facility]    Elimination Status: Continent: wears briefs     Activity Level: 2 assist; Wheelchair/bed bound at baseline. Only able to stand pivot.     Patients Preferred Language:  English     Needed: No    Vital Signs:  BP (!) 144/89   Pulse 79   Temp 97.5  F (36.4  C) (Oral)   Resp 21   Ht 1.803 m (5' 11\")   Wt 89.8 kg (198 lb)   SpO2 96%   BMI 27.62 kg/m       Cardiac Rhythm: NSR    Pain Score: Patient is unable to quantify.    Is the Patient Confused:  No. HX of dementia. Alert and oriented x 4. Mostly answers \"yes and no\" questions.     Last Food or Drink: 03/05/25     Focused Assessment:  Denies chest pain and N/V. Reports SOB. Lung sounds coarse with expiratory wheezing.     Tests Performed: Done: Labs and Imaging    Treatments Provided:      Family Dynamics/Concerns: No    Belongings Checklist Done and Signed by Patient: Yes    Belongings Sent with Patient: Clothes and shoes.     Boarding medications sent with patient: None    Additional Information: N/a    RN: Qunin Rosario RN   3/5/2025 4:32 PM       "

## 2025-03-06 ENCOUNTER — APPOINTMENT (OUTPATIENT)
Dept: PHYSICAL THERAPY | Facility: CLINIC | Age: 75
DRG: 190 | End: 2025-03-06
Payer: COMMERCIAL

## 2025-03-06 ENCOUNTER — APPOINTMENT (OUTPATIENT)
Dept: CT IMAGING | Facility: CLINIC | Age: 75
DRG: 190 | End: 2025-03-06
Payer: COMMERCIAL

## 2025-03-06 ASSESSMENT — ACTIVITIES OF DAILY LIVING (ADL)
DEPENDENT_IADLS:: CLEANING;COOKING;LAUNDRY;SHOPPING;MEAL PREPARATION;MEDICATION MANAGEMENT;MONEY MANAGEMENT;TRANSPORTATION;INCONTINENCE
ADLS_ACUITY_SCORE: 72
ADLS_ACUITY_SCORE: 74
ADLS_ACUITY_SCORE: 68
ADLS_ACUITY_SCORE: 74
ADLS_ACUITY_SCORE: 74
ADLS_ACUITY_SCORE: 68
ADLS_ACUITY_SCORE: 70
ADLS_ACUITY_SCORE: 74
ADLS_ACUITY_SCORE: 68
ADLS_ACUITY_SCORE: 74
ADLS_ACUITY_SCORE: 68
ADLS_ACUITY_SCORE: 70
ADLS_ACUITY_SCORE: 70
ADLS_ACUITY_SCORE: 74
ADLS_ACUITY_SCORE: 74
ADLS_ACUITY_SCORE: 70
ADLS_ACUITY_SCORE: 74
ADLS_ACUITY_SCORE: 70
ADLS_ACUITY_SCORE: 74
ADLS_ACUITY_SCORE: 68
ADLS_ACUITY_SCORE: 74

## 2025-03-06 NOTE — PLAN OF CARE
Goal Outcome Evaluation:      Plan of Care Reviewed With: patient, child    Overall Patient Progress: improvingOverall Patient Progress: improving    Outcome Evaluation: Antcipate return to [facility]; medical transport    Karmen Hartmann RN CM

## 2025-03-06 NOTE — PLAN OF CARE
Pt is not appropriate for skilled OT services at this time due to no acute OT needs- pt is at baseline for ADLs and receives assist with ADLs at Memory Care.  Will defer to PT for strengthening and fxl mob.  Plan was discussed with PT, RN.  Will D/C current OT orders. Thank you.    3/6/2025 by Syl Thacker, OT, OTR/L

## 2025-03-06 NOTE — PLAN OF CARE
Problem: Gas Exchange Impaired  Goal: Optimal Gas Exchange  Outcome: Progressing  Intervention: Optimize Oxygenation and Ventilation    Goal Outcome Evaluation:    Pt is A&O to self only per baseline. Denying pain. On 2L O2 per baseline. Tachypneic with expiratory wheezes. Nebs done by RT. O2 stable. Purewick on overnight. Voiding adequately. Repositioned q2 overnight. Bed alarms on for safety.

## 2025-03-06 NOTE — PROGRESS NOTES
Nebs given as scheduled.  BS: diminished with expiratory wheeze, increased aeration post treatment.  Pt on 2L.

## 2025-03-06 NOTE — CONSULTS
"  Pulmonary/Critical Care Consult Team Note    Stephanie Acuna,  1950, MRN [id number]  Admitting Dx: Bronchiectasis with acute exacerbation (H) [J47.1]  Date / Time of Admission:  3/5/2025 10:44 AM    Recent Events:  Intake/Output last 3 shifts:  I/O last 3 completed shifts:  In: 480 [P.O.:480]  Out: 1000 [Urine:1000]    She has baseline dementia  Can answer some yes/no questions with limited accuracy    Assessment/Plan: Stephanie Acuna is a 74 year old female with PMHx of Chronic resp failure on 2L O2, Bronchiectasis, Asthma, hypertension, DVT, and dementia who presents with increase FiO2 needs and wheezing from assisted living center.    Acute on Chronic Resp failure due to Bronchiectasis vs Aasthma exacerbation  - on Mucinex  - Duonebs q4hrs  - Pulmicort can hold given systemic steroids  - methylpred 40q12  - Levaquin    Looks like she sees [facility] for pulmonary - recommend follow up appointment with them    Medical Care Time excluding procedures and family discussions greater than: 45 Minutes    Risk Factors Present on Admission:  Clinically Significant Risk Factors Present on Admission                   # Hypertension: Home medication list includes antihypertensive(s)      # Anemia: based on hgb <11       # Overweight: Estimated body mass index is 27.62 kg/m  as calculated from the following:    Height as of this encounter: 1.803 m (5' 11\").    Weight as of this encounter: 89.8 kg (198 lb).       # Asthma: noted on problem list     # Anemia: based on hgb <11   Code Status: No CPR- Do NOT Intubate         Evelyn Sofia DO  Pulmonary and Critical Care Attending  pgr [phone]    Allergies   Allergen Reactions    Amlodipine Swelling    Escitalopram Other (See Comments)     Other Reaction(s): Insomnia/personailty changes    And personality changes    Loratadine      Other Reaction(s): Very dry    Sulfa Antibiotics Unknown       Meds: See MAR    Physical Exam:  BP (!) 163/78 (BP Location: Left arm)  " " Pulse 73   Temp 97.8  F (36.6  C) (Oral)   Resp 20   Ht 1.803 m (5' 11\")   Wt 89.8 kg (198 lb)   SpO2 97%   BMI 27.62 kg/m    Intake/Output this shift:  No intake/output data recorded.  GEN: laying in bed, NAD  HEENT: NC in place, MMM  CVS: regular rhythm, no murmurs  RESP: faint rhonchi at bases, no wheezing  ABD: Soft, No abdominal pain with palpation, no guarding, no rigidity  EXT: Warm, well perfused, no LE edema  NEURO: nonfocal    Pertinent Labs: Latest lab results in EHR personally reviewed.   CMP  Recent Labs   Lab 03/05/25  1127      POTASSIUM 4.2   CHLORIDE 99   CO2 25   ANIONGAP 14   *   BUN 16.0   CR 0.94   GFRESTIMATED 63   LIZETH 9.6   MAG 1.9   PROTTOTAL 6.8   ALBUMIN 4.3   BILITOTAL 0.3   ALKPHOS 85   AST 29   ALT 34     CBC  Recent Labs   Lab 03/06/25  0729 03/05/25  1127   WBC 9.1 8.2   RBC 3.40* 3.84   HGB 10.7* 12.2   HCT 32.0* 36.3   MCV 94 95   MCH 31.5 31.8   MCHC 33.4 33.6   RDW 13.3 13.2    282     INR  Recent Labs   Lab 03/05/25  1127   INR 1.02     Arterial Blood GasNo lab results found in last 7 days.    Cultures: not yet available.      Imaging: personally reviewed.   Results for orders placed during the hospital encounter of 03/05/25    XR Chest Port 1 View    Narrative  EXAM: XR CHEST PORT 1 VIEW  LOCATION: [facility]  DATE: 3/5/2025    INDICATION: Shortness of breath.  COMPARISON: Chest radiograph 6/20/2024. CT chest 1/4/2025.    Impression  IMPRESSION:    Elevated right hemidiaphragm. Right greater than left bibasilar atelectasis and/or scarring. Lungs are otherwise clear. No pleural effusions or pneumothorax. Normal pulmonary vascularity.    Nonenlarged cardiac silhouette.    Old healed fractures of the left posterolateral 5th-7th ribs. Mild scoliosis.    Results for orders placed during the hospital encounter of 01/14/25    CT Chest Pulmonary Embolism w Contrast    Narrative  EXAM: CT CHEST PULMONARY EMBOLISM W CONTRAST  LOCATION: " [facility]  DATE: 01/14/2025    INDICATION: SOB, cough, negative CXR.  COMPARISON: None.  TECHNIQUE: CT chest pulmonary angiogram during arterial phase injection of IV contrast. Multiplanar reformats and MIP reconstructions were performed. Dose reduction techniques were used.  CONTRAST: Isovue 370, 75 mL.    FINDINGS:  ANGIOGRAM CHEST: Pulmonary arteries are normal caliber and negative for pulmonary emboli. Thoracic aorta is negative for dissection. No CT evidence of right heart strain.    LUNGS AND PLEURA: Bronchiolar wall thickening in the lung bases with mild regions of bronchiectasis in the lungs. Segmental elevation of the right hemidiaphragm. There is compressive consolidation in the right middle and lower lobes. Very minimal patchy  infiltrate within the lingula. Regions of mucous plugging in the lung bases.    MEDIASTINUM/AXILLAE: Normal.    CORONARY ARTERY CALCIFICATION: Moderate.    UPPER ABDOMEN: Normal.    MUSCULOSKELETAL: Mild wedge deformity of the midthoracic vertebral body.    Impression  IMPRESSION:  1.  No pulmonary embolism.    2.  Mild bronchiectasis in the lungs with bronchiolar wall thickening, predominantly in the mid and lower lungs, and regions of mucous plugging in the lung bases.    Patient Active Problem List   Diagnosis    Asthma    Bronchiectasis (H)    Chronic sinusitis    Cough    Pulmonary infiltrate on chest x-ray    Bronchiectasis with acute exacerbation (H)    Hypoxia    Acute renal failure, unspecified acute renal failure type    Dyspnea, unspecified type         Surgical History  She  has a past surgical history that includes Hysterectomy (Early 1990's) and Oophorectomy (Bilateral, Early 1990's).    Family History  Reviewed, and family history includes Ovarian Cancer in her maternal grandmother.    Social History  Reviewed, and she      Allergies  Allergies   Allergen Reactions    Amlodipine Swelling    Escitalopram Other (See Comments)     Other  Reaction(s): Insomnia/personailty changes    And personality changes    Loratadine      Other Reaction(s): Very dry    Sulfa Antibiotics Unknown              Evelyn Sofia, DO  Pulmonary and Critical Care Attending  pgr [phone]    Securely message with the [employer] (learn more here)

## 2025-03-06 NOTE — PROGRESS NOTES
[facility]    Progress Note - Hospitalist Service       Date of Admission:  3/5/2025    Assessment & Plan   Stephanie Acuna is a 74 year old female admitted on 3/5/2025. She has no significant past medical history ahistory of chronic bronchiectasis, asthma, hypertension, DVT (1/19/24), dementia and is admitted for increased work of breathing secondary to exacerbation of chronic bronchiectasis.     Acute on Chronic Respiratory Failure  Chronic bronchiectasis exacerbation  Increased work of breathing  Has chronic bronchiectasis that has had flareups requiring steroids in the past.  Recent hospitalization 1/14-1/20 with bronchiectasis flareup secondary to acute asthma exacerbation. This admission, increased work of breathing with belly breathing. Viral panel negative. Xray showing atelectasis. Labs overall wnl. Daughter is concerned that she has had multiple recent respiratory flare ups after reported being stable. Last followed with Pulm in 2023, no current pulmonologist.  S/p 125mg IV solumedrol and 1 dose levofloxacin in the ED. D-dimer elevated, CTPE study comprmised by respiratory motion and shallow inspiration but no evidence of acute PE    - Sputum culture  - RCAT  - Incentive spirometry  - PT/OT  - Pulmonology consult, Appreciate recs:   - 40 mg methylprednisolone BID    - Levaquin 500mg q24   - Hold Pulmicort  - PTA mucinex  - DuoNebs every 4 hours    Asthma  Patient has diagnosis of asthma given an IgE level greater than 400 as well as peripheral eosinophilia seen back in 2018.   - Hold PTA budesonide given systemic steroids     Allergies  PTA daily cetirizine 10 mg     Hypertension  -PTA hydrochlorothiazide 25 mg daily  -PTA losartan 50 mg daily     Anxiety  -bupropion 150 mg daily     Dementia  -Hold PTA donepezil due to advised caution in patients with asthma  - Delirium prevention orders           Diet: Combination Diet Regular Diet Adult    DVT Prophylaxis: Enoxaparin  "(Lovenox) SQ  Rubio Catheter: Not present  Fluids: PO  Lines: None     Cardiac Monitoring: None  Code Status: No CPR- Do NOT Intubate      Clinically Significant Risk Factors Present on Admission                   # Hypertension: Home medication list includes antihypertensive(s)      # Anemia: based on hgb <11       # Overweight: Estimated body mass index is 27.62 kg/m  as calculated from the following:    Height as of this encounter: 1.803 m (5' 11\").    Weight as of this encounter: 89.8 kg (198 lb).       # Asthma: noted on problem list        Social Drivers of Health   Tobacco Use: Medium Risk (7/14/2024)    [facility]    Patient History     Smoking Tobacco Use: Former     Smokeless Tobacco Use: Never         Disposition Plan     Medically Ready for Discharge: Anticipated Tomorrow         The patient's care was discussed with the Attending Physician, Dr. Odom .    Arlen Pickens MD  Hospitalist Service  [facility]  Securely message with [employer] (more info)  Text page via [employer] Paging/Directory   ______________________________________________________________________    Interval History   Patient still endorses shortness of breath today, and cough is stable but per daughter seems to be more productive/ attempting to bring sputum up. Worked well with PT today, patient should not be classified as wheelchair bound. Patient's daughter is interested in a referral to follow with [facility] Pulmonology outpatient and establishing care with them as she has not seen a pulmonologist since 2023.    Physical Exam   Vital Signs: Temp: 97.8  F (36.6  C) Temp src: Oral BP: (!) 163/78 Pulse: 73   Resp: 20 SpO2: 97 % O2 Device: Nasal cannula Oxygen Delivery: 2 LPM  Weight: 198 lbs 0 oz    PHYSICAL EXAM:  GENERAL: Awake, alert, answering yes/no, as well as one word answers  HEENT: No scleral icterus or conjunctival injection. Nose with NC, normal lids and " lashes  SKIN: Warm and dry. No bruises, rashes, or skin lesions.  LUNGS: Mildly increased work of breathing with slight belly breathing, no subcostal retractions. Upper airway sounds/ noisy breathing heard when next to patient. Decreased air entry, diffuse wheezing (anterior>posterior lung fields).  CARDIAC: Cardiac exam difficult to perform due to pervasive wheezing. No peripheral edema  ABDOMEN: Non-distended. Soft and non-tender throughout with no masses or organomegaly.  EXTREMITIES: No gross deformity or peripheral edema. Appear well-perfused    Data     I have personally reviewed the following data over the past 24 hrs:    9.1  \   10.7 (L)   / 274     N/A N/A N/A /  N/A   N/A N/A N/A \     Trop: 24 (H) BNP: N/A     INR:  N/A PTT:  N/A   D-dimer:  N/A Fibrinogen:  N/A       Imaging results reviewed over the past 24 hrs:   Recent Results (from the past 24 hours)   CT Chest Pulmonary Embolism w Contrast    Narrative    EXAM: CT CHEST PULMONARY EMBOLISM W CONTRAST  LOCATION: [facility]  DATE: 3/6/2025    INDICATION: Increased work of breathing and elevated d-dimer. Evaluate for acute pulmonary embolus.  COMPARISON: Chest CTA 1/14/2025  TECHNIQUE: CT chest pulmonary angiogram during arterial phase injection of IV contrast. Multiplanar reformats and MIP reconstructions were performed. Dose reduction techniques were used.   CONTRAST: Isovue 370 90ml    FINDINGS:  ANGIOGRAM CHEST: Respiratory motion. Pulmonary arteries are normal caliber and negative for pulmonary emboli. Some peripheral lower lobe subsegmental branches are obscured by motion artifact. Thoracic aorta is negative for dissection. No CT evidence of   right heart strain.    LUNGS AND PLEURA: Very shallow inspiration. Chronically elevated right hemidiaphragm with right basilar comparison subsegmental atelectasis. Scattered bilateral subpleural scar redemonstrated. No pleural effusion. Mild bronchial wall thickening. Luminal    narrowing of central airways likely secondary to shallow inspiration.    MEDIASTINUM/AXILLAE: No adenopathy nor pericardial effusion. The heart is normal in size.    CORONARY ARTERY CALCIFICATION: Moderate.    UPPER ABDOMEN: Negative.    MUSCULOSKELETAL: No suspicious osseous lesions.      Impression    IMPRESSION:  1.  Study compromised by respiratory motion and shallow inspiration.  2.  No evidence of acute pulmonary embolus.  3.  Mild bronchial wall thickening redemonstrated. No focal consolidation or pleural effusion.

## 2025-03-06 NOTE — CONSULTS
Care Management Initial Consult    General Information  Assessment completed with: Loan Elizabeth  Type of CM/SW Visit: Initial Assessment    Primary Care Provider verified and updated as needed: Yes   Readmission within the last 30 days:        Reason for Consult: discharge planning  Advance Care Planning: Advance Care Planning Reviewed: present on chart          Communication Assessment  Patient's communication style: spoken language (English or Bilingual)             Cognitive  Cognitive/Neuro/Behavioral: .WDL except, level of consciousness, orientation, speech  Level of Consciousness: confused, alert  Arousal Level: opens eyes spontaneously  Orientation: disoriented to, place, time, situation  Mood/Behavior: cooperative     Speech: illogical    Living Environment:   People in home: alone, facility resident     Current living Arrangements: assisted living  [facility] of Facility: [facility]   Able to return to prior arrangements: yes       Family/Social Support:  Care provided by: other (see comments)  Provides care for: no one, unable/limited ability to care for self     Support system: Children          Description of Support System: Supportive, Involved         Current Resources:   Patient receiving home care services:          Community Resources:    Equipment currently used at home: walker, rolling  Supplies currently used at home: Oxygen Tubing/Supplies ([employer] (2LPM continuous))    Employment/Financial:  Employment Status: retired        Financial Concerns: none           Does the patient's insurance plan have a 3 day qualifying hospital stay waiver?  No    Lifestyle & Psychosocial Needs:  Social Drivers of Health     Food Insecurity: Low Risk  (1/20/2025)    Food Insecurity     Within the past 12 months, did you worry that your food would run out before you got money to buy more?: No     Within the past 12 months, did the food you bought just not last and you  didn t have money to get more?: No   Depression: Not on file   Housing Stability: Low Risk  (1/20/2025)    Housing Stability     Do you have housing? : Yes     Are you worried about losing your housing?: No   Tobacco Use: Medium Risk (7/14/2024)    Received from [facility]    Patient History     Smoking Tobacco Use: Former     Smokeless Tobacco Use: Never     Passive Exposure: Not on file   Financial Resource Strain: Low Risk  (2/1/2025)    Received from [facility]    Financial Resource Strain     Difficulty of Paying Living Expenses: 3     Difficulty of Paying Living Expenses: Not on file   Alcohol Use: Not on file   Transportation Needs: Low Risk  (1/20/2025)    Transportation Needs     Within the past 12 months, has lack of transportation kept you from medical appointments, getting your medicines, non-medical meetings or appointments, work, or from getting things that you need?: No   Physical Activity: Not on file   Interpersonal Safety: Low Risk  (1/16/2025)    Interpersonal Safety     Do you feel physically and emotionally safe where you currently live?: Yes     Within the past 12 months, have you been hit, slapped, kicked or otherwise physically hurt by someone?: No     Within the past 12 months, have you been humiliated or emotionally abused in other ways by your partner or ex-partner?: No   Stress: Not on file   Social Connections: Socially Integrated (7/14/2024)    [facility]    Social Connections     Do you often feel lonely or isolated from those around you?: 0   Health Literacy: Not on file       Functional Status:  Prior to admission patient needed assistance:   Dependent ADLs:: Wheelchair-with assist, Bathing, Dressing, Grooming, Incontinence, Transfers, Toileting  Dependent IADLs:: Cleaning, Cooking, Laundry, Shopping, Meal Preparation, Medication Management, Money Management,  "Transportation, Incontinence       Mental Health Status:  Mental Health Status:  (dementia)       Chemical Dependency Status:  Chemical Dependency Status: No Current Concerns             Values/Beliefs:  Spiritual, Cultural Beliefs, Sabianism Practices, Values that affect care: no               Discussed  Partnership in Safe Discharge Planning  document with patient/family: No    Additional Information:  Assessed via pt's daughter Elizabeth d/t pt confusion.  Pt resides at [facility] ([facility] known as [facility]  Pt pivots to wheelchair with assistx1-2, and receives assistance for all I/ADL's, including medication management, escorts to meals.  Pt's [facility] coordinated homecare PT and OT for this pt through [facility].  Pt uses home O2 at baseline ([employer], 2 LPM continuous).  Daughter Elizabeth confirms discharge goal is to return to [facility].     Transportation - Elizabeth requests medical transport for patient, stating she and other family members do not have a vehicle low enough to transport pt back to home.  Elizabeth also states she does not have an O2 transport tank for patient.     [facility] [phone]; Fax [phone] - Nurse Interiano (Interim Director of Health Services) [phone] - CM called.  Left voicemail.  Awaiting return call to confirm pt's baseline and services.    12:51 PM   Per Nurse Interiano, Pt's baseline:  Pivot assistx1-2, Toileting assistx2.  Manage home O2. Med management. Laundry, linen. Bathing assistx1.  Dressing assistx1.  Grooming standby cueing. [facility].     Next Steps:   Coordinate discharge with [facility]    CM will continue to follow.    Karmen Hartmann RN    "

## 2025-03-06 NOTE — PROGRESS NOTES
03/06/25 0937   Appointment Info   Signing Clinician's Name / Credentials (PT) Tyler Keen, RICHARD   Quick Adds   Quick Adds Certification   Living Environment   People in Home facility resident;alone   Current Living Arrangements other (see comments)  (memory care)   Home Accessibility stairs to enter home;stairs within home   Number of Stairs, Main Entrance none   Transportation Anticipated agency   Self-Care   Usual Activity Tolerance fair   Current Activity Tolerance fair   Equipment Currently Used at Home walker, rolling   Fall history within last six months yes   Activity/Exercise/Self-Care Comment Patient needing assist with all I ADL's  and most ADL's per notes. Patient not able to give accurate information . Patient at baseline has been on 2 liters for past month.  Per note has been wheel chair bound and needing assist of 1-2 for transfers   General Information   Onset of Illness/Injury or Date of Surgery 03/05/25   Referring Physician Arlen Pickens MD   Patient/Family Therapy Goals Statement (PT) none stated   Pertinent History of Current Problem (include personal factors and/or comorbidities that impact the POC) 74 year old female admitted on 3/5/2025. She has a history of chronic bronchiectasis, asthma, hypertension, DVT (1/19/24), dementia and is admitted for increased work of breathing secondary to exacerbation of chronic bronchiectasis.   Existing Precautions/Restrictions no known precautions/restrictions   Cognition   Affect/Mental Status (Cognition) unable/difficult to assess   Cognitive Status Comments responds with yes/no but not sure of accuracy   Posture    Posture Not impaired   Range of Motion (ROM)   Range of Motion ROM is WFL   Strength (Manual Muscle Testing)   Strength Comments general weakness   Bed Mobility   Bed Mobility supine-sit   Supine-Sit [place] (Bed Mobility) supervision   Transfers   Transfers sit-stand transfer   Sit-Stand Transfer   Sit-Stand [place]  (Transfers) contact guard;verbal cues   Assistive Device (Sit-Stand Transfers) walker, front-wheeled   Gait/Stairs (Locomotion)   [place] Level (Gait) contact guard;verbal cues   Assistive Device (Gait) walker, front-wheeled   Distance in Feet (Gait) 3   Pattern (Gait) step-through   Deviations/Abnormal Patterns (Gait) gait speed decreased;randee decreased   Clinical Impression   Criteria for Skilled Therapeutic Intervention Yes, treatment indicated   PT Diagnosis (PT) Impaired functional mobility   Influenced by the following impairments weakness, pain   Functional limitations due to impairments transfers, gait   Clinical Presentation (PT Evaluation Complexity) stable   Clinical Presentation Rationale Patient presents as medically diagnosed   Clinical Decision Making (Complexity) low complexity   Planned Therapy Interventions (PT) gait training;transfer training;patient/family education   Risk & Benefits of therapy have been explained patient;daughter   Clinical Impression Comments Mitzi was ambulating with staff up until January when ended up in hospital.  Mitzi demonstrates enough strength/balance  during evaluation and should not be considered wheel chair bound.   PT Total Evaluation Time   PT Eval, Low Complexity Minutes (12394) 10   Therapy Certification   Start of care date 03/06/25   Certification date from 03/06/25   Certification date to 03/06/25   Physical Therapy Goals   PT Frequency 5x/week   PT Predicted Duration/Target Date for Goal Attainment 03/13/25   PT Goals Transfers;Gait   PT: Transfers Supervision/stand-by assist;Sit to/from stand;Assistive device;Within precautions   PT: Gait Minimal assist;Rolling walker;25 feet   Interventions   Interventions Quick Adds Therapeutic Activity   Therapeutic Activity   Therapeutic Activities: dynamic activities to improve functional performance Minutes (63181) 8   Symptoms Noted During/After Treatment None   Treatment Detail/Skilled Intervention  Patient able to STS from recliner  but needing cueing for hand placement each time she stood. STS x 3 and able to maintain standing for 15-30 sec each time.  Able to take  several steps forward and then back with use of FWW and cga   PT Discharge Planning   PT Plan progress with gait/transfers, may need to use chair follow.  Paitent has much more potential than being wc bound   PT Discharge Recommendation (DC Rec) home with assist;home with home care physical therapy   PT Rationale for DC Rec Patient below baseline than what she was prior to becoming sick in January .  Paitent has much more potential than to be wc bound.  Would beenfit from further rehab to return to prior level of function   PT Brief overview of current status Patient min assist with transfers and short distance gait   PT Total Distance Amb During Session (feet) 5   PT Equipment Needed at Discharge walker, rolling   Physical Therapy Time and Intention   Timed Code Treatment Minutes 8   Total Session Time (sum of timed and untimed services) 18

## 2025-03-06 NOTE — PROVIDER NOTIFICATION
03/05/25 1851   Tech Time   $Tech Time (10 minute increments) 3   Vital Signs   Resp 22   Pulse Rate Source Monitor   Oximeter Heart Rate 91 bpm   Patient Position Semi-Cardona's   Oxygen Therapy   SpO2 95 %   O2 Device Nasal cannula   Oxygen Delivery 2 LPM   Assessment   Respiratory WDL X;breath sounds;rhythm/pattern   Rhythm/Pattern, Respiratory other (see comments)  (upper airway wheeze)   Breath Sounds   Breath Sounds All Fields   All Lung Fields Breath Sounds rhonchi;wheezes, expiratory   Nebulizer Assessment & Treatment   $RT Use ONLY Delivery Method Nebulizer - Additional   Daily Review of Necessity (SVN) completed   Nebulizer Device Face mask   Pretreatment Heart Rate (beats/min) 92   Pretreatment Resp Rate (breaths/min) 20   Pretreatment O2 sats - (TCU only) 95   Pretreat Breath Sounds - Bilat - All Lobes wheezes, expiratory   Patient Position HOB elevated   Respiratory Treatment Status (SVN) given   Incentive Spirometer (IS)   Incentive Spirometer Predicted Level (mL) 2350   Administration (IS) mouthpiece utilized   Patient Tolerance (IS) poor     Increased aeration post. Patient has a forced end expiratory wheeze. Remains on 2LNC which is her baseline.

## 2025-03-07 ASSESSMENT — ACTIVITIES OF DAILY LIVING (ADL)
HEARING_DIFFICULTY_OR_DEAF: NO
DRESSING/BATHING: BATHING DIFFICULTY, ASSISTANCE 1 PERSON;DRESSING DIFFICULTY, ASSISTANCE 1 PERSON
ADLS_ACUITY_SCORE: 76
DRESSING/BATHING_DIFFICULTY: YES
DIFFICULTY_EATING/SWALLOWING: NO
ADLS_ACUITY_SCORE: 76
COMMUNICATION: DIFFICULTY SPEAKING
TOILETING: 1-->ASSISTANCE (EQUIPMENT/PERSON) NEEDED
TOILETING_ISSUES: YES
CHANGE_IN_FUNCTIONAL_STATUS_SINCE_ONSET_OF_CURRENT_ILLNESS/INJURY: YES
EQUIPMENT_CURRENTLY_USED_AT_HOME: WHEELCHAIR, MANUAL
WEAR_GLASSES_OR_BLIND: YES
ADLS_ACUITY_SCORE: 72
ADLS_ACUITY_SCORE: 76
ADLS_ACUITY_SCORE: 76
ADLS_ACUITY_SCORE: 72
CONCENTRATING,_REMEMBERING_OR_MAKING_DECISIONS_DIFFICULTY: YES
TOILETING: 1-->ASSISTANCE (EQUIPMENT/PERSON) NEEDED (NOT DEVELOPMENTALLY APPROPRIATE)
WALKING_OR_CLIMBING_STAIRS_DIFFICULTY: YES
ADLS_ACUITY_SCORE: 76
ADLS_ACUITY_SCORE: 76
DOING_ERRANDS_INDEPENDENTLY_DIFFICULTY: YES
FALL_HISTORY_WITHIN_LAST_SIX_MONTHS: YES
DIFFICULTY_COMMUNICATING: YES
ADLS_ACUITY_SCORE: 72
TOILETING_ASSISTANCE: TOILETING DIFFICULTY, REQUIRES EQUIPMENT
ADLS_ACUITY_SCORE: 76

## 2025-03-07 NOTE — PLAN OF CARE
Patient resting in between cares. Alert only to self. Repositioned every 2 hours. Lung sounds are course/wet sounding even without ascultation. Duo neb schedule continued as well as IV Solumol

## 2025-03-07 NOTE — PROGRESS NOTES
[facility]    Progress Note - Hospitalist Service       Date of Admission:  3/5/2025    Assessment & Plan   Stephanie Acuna is a 74 year old female admitted on 3/5/2025. She has no significant past medical history ahistory of chronic bronchiectasis, asthma, hypertension, DVT (1/19/24), dementia and is admitted for increased work of breathing secondary to exacerbation of chronic bronchiectasis.     Acute on Chronic Respiratory Failure  Chronic bronchiectasis exacerbation  Increased work of breathing  Has chronic bronchiectasis that has had flareups requiring steroids in the past.  Recent hospitalization 1/14-1/20 with bronchiectasis flareup secondary to acute asthma exacerbation. This admission, increased work of breathing with belly breathing. Viral panel negative. Xray showing atelectasis. Labs overall wnl. Daughter is concerned that she has had multiple recent respiratory flare ups after reported being stable. Last followed with Pulm in 2023, no current pulmonologist.  S/p 125mg IV solumedrol and 1 dose levofloxacin in the ED. D-dimer elevated, CTPE study comprmised by respiratory motion and shallow inspiration but no evidence of acute PE    - Sputum culture  - RCAT  - Incentive spirometry  - PT/OT  - Pulmonology consult, Appreciate recs:   - 40 mg methylprednisolone BID    - Levaquin 500mg q24   - Hold Pulmicort  - PTA mucinex  - DuoNebs every 4 hours    Asthma  Patient has diagnosis of asthma given an IgE level greater than 400 as well as peripheral eosinophilia seen back in 2018.   - Hold PTA budesonide given systemic steroids     Allergies  PTA daily cetirizine 10 mg     Hypertension  -PTA hydrochlorothiazide 25 mg daily  -PTA losartan 50 mg daily     Anxiety  -bupropion 150 mg daily     Dementia  -Hold PTA donepezil due to advised caution in patients with asthma  - Delirium prevention orders           Diet: Combination Diet Regular Diet Adult    DVT Prophylaxis: Enoxaparin  "(Lovenox) SQ  Rubio Catheter: Not present  Fluids: PO  Lines: None     Cardiac Monitoring: None  Code Status: No CPR- Do NOT Intubate      Clinically Significant Risk Factors   { TIP  Diagnoses will continue to appear throughout the admission.  :[id number]}                           # Overweight: Estimated body mass index is 27.62 kg/m  as calculated from the following:    Height as of this encounter: 1.803 m (5' 11\").    Weight as of this encounter: 89.8 kg (198 lb)., PRESENT ON ADMISSION     # Financial/Environmental Concerns: none  # Asthma: noted on problem list        Social Drivers of Health   Tobacco Use: Medium Risk (7/14/2024)    Received from [facility]    Patient History     Smoking Tobacco Use: Former     Smokeless Tobacco Use: Never   Interpersonal Safety: Unknown (3/7/2025)    Interpersonal Safety     Do you feel physically and emotionally safe where you currently live?: Patient unable to answer     Within the past 12 months, have you been hit, slapped, kicked or otherwise physically hurt by someone?: Patient unable to answer     Within the past 12 months, have you been humiliated or emotionally abused in other ways by your partner or ex-partner?: Patient unable to answer         Disposition Plan   {TIP  It is advised to update the Medical Readiness for Discharge [MRD] daily, until the patient is 'Ready Now.' Last Documentation- Anticipated Tomorrow 03/07/2025. Use the SmartList below to update for today:[id number]}  Medically Ready for Discharge: Anticipated Tomorrow         The patient's care was discussed with the Attending Physician, Dr. Odom .    Arlen Pickens MD  Hospitalist Service  [facility]  Securely message with [employer] (more info)  Text page via [facility] Paging/Directory   ______________________________________________________________________    Interval History   Patient still endorses shortness of breath today, and cough is stable but " per daughter seems to be more productive/ attempting to bring sputum up. Worked well with PT today, patient should not be classified as wheelchair bound. Patient's daughter is interested in a referral to follow with [facility] outpatient and establishing care with them as she has not seen a pulmonologist since 2023.    Physical Exam   Vital Signs: Temp: 97.8  F (36.6  C) Temp src: Oral BP: 139/69 Pulse: 75   Resp: 16 SpO2: 95 % O2 Device: Nasal cannula Oxygen Delivery: 2 LPM  Weight: 198 lbs 0 oz    PHYSICAL EXAM:  GENERAL: Awake, alert, answering yes/no, as well as one word answers  HEENT: No scleral icterus or conjunctival injection. Nose with NC, normal lids and lashes  SKIN: Warm and dry. No bruises, rashes, or skin lesions.  LUNGS: Mildly increased work of breathing with slight belly breathing, no subcostal retractions. Upper airway sounds/ noisy breathing heard when next to patient. Decreased air entry, diffuse wheezing (anterior>posterior lung fields).  CARDIAC: Cardiac exam difficult to perform due to pervasive wheezing. No peripheral edema  ABDOMEN: Non-distended. Soft and non-tender throughout with no masses or organomegaly.  EXTREMITIES: No gross deformity or peripheral edema. Appear well-perfused    Data   { TIP    No CBC, BMP, LFT, lipase, coag, troponin, BNP, TSH, A1C, CRP, procal, lactic acid, D-dimer, fibrinogen, ferritin, retic, or LDH results found in the past 24 hrs      :21744}      Imaging results reviewed over the past 24 hrs:   Recent Results (from the past 24 hours)   CT Chest Pulmonary Embolism w Contrast    Narrative    EXAM: CT CHEST PULMONARY EMBOLISM W CONTRAST  LOCATION: [facility]  DATE: 3/6/2025    INDICATION: Increased work of breathing and elevated d-dimer. Evaluate for acute pulmonary embolus.  COMPARISON: Chest CTA 1/14/2025  TECHNIQUE: CT chest pulmonary angiogram during arterial phase injection of IV contrast. Multiplanar reformats and MIP  reconstructions were performed. Dose reduction techniques were used.   CONTRAST: Isovue 370 90ml    FINDINGS:  ANGIOGRAM CHEST: Respiratory motion. Pulmonary arteries are normal caliber and negative for pulmonary emboli. Some peripheral lower lobe subsegmental branches are obscured by motion artifact. Thoracic aorta is negative for dissection. No CT evidence of   right heart strain.    LUNGS AND PLEURA: Very shallow inspiration. Chronically elevated right hemidiaphragm with right basilar comparison subsegmental atelectasis. Scattered bilateral subpleural scar redemonstrated. No pleural effusion. Mild bronchial wall thickening. Luminal   narrowing of central airways likely secondary to shallow inspiration.    MEDIASTINUM/AXILLAE: No adenopathy nor pericardial effusion. The heart is normal in size.    CORONARY ARTERY CALCIFICATION: Moderate.    UPPER ABDOMEN: Negative.    MUSCULOSKELETAL: No suspicious osseous lesions.      Impression    IMPRESSION:  1.  Study compromised by respiratory motion and shallow inspiration.  2.  No evidence of acute pulmonary embolus.  3.  Mild bronchial wall thickening redemonstrated. No focal consolidation or pleural effusion.

## 2025-03-07 NOTE — PROGRESS NOTES
Care Management Discharge Note    Discharge Date: 03/07/2025       Discharge Disposition: Assisted Living    Discharge Services: Transportation Services    Discharge DME:      Discharge Transportation: agency    Private pay costs discussed: transportation costs CM discussed transportation cost with daughter Elizabeth. CM quoted for non-emergent BLS transport is about $1354.65 base rate plus $31.61 per mile.     Does the patient's insurance plan have a 3 day qualifying hospital stay waiver?  No    PAS Confirmation Code:    Patient/family educated on Medicare website which has current facility and service quality ratings: yes    Education Provided on the Discharge Plan:  Yes AVS per bedside nurse   Persons Notified of Discharge Plans:  patient, daughter Elizabeth  Patient/Family in Agreement with the Plan: yes    Handoff Referral Completed: No, handoff not indicated or clinically appropriate    Additional Information:    [facility] stretcher to [facility] Fri 3/7, pickup window 1345-1430pm.     PCS done.    [facility] (previously known as [facility]) [phone]; Fax [phone] - Nurse Laisha (Interim Director of Health Services) [phone] - Laisha states okay for pt to return to  unit today 3/7, with this ride time.  Pt must arrive back by 1530pm.  CM sent notes to Laisha via fax.  Provided [facility] Nurse's station phone number to Laisha.    Update given to bedside RN, Charge RN, NICOLE, BFM.  Update given to daughter Elizabeth.  Elizabeth agreeable to discharge plan.    1:17 PM  Discharge orders sent to facility via Fax.  [facility] to fax to facility/send with patient: discharge orders, hard scripts, additional discharge paperwork, as per protocol.    No further care management intervention anticipated at this time.  Please re-consult if further needs arise.  Care management signing off.        Karmen Hartmann RN

## 2025-03-07 NOTE — PLAN OF CARE
Patient with A1 walker and gait belt got on bedside commode and voided 400cc and had a medium BM. Notified BFM and pt cleared for discharge. IV out, pt dressed. Son was here with pt and updated. Pt just left via stretcher back to [facility].

## 2025-03-07 NOTE — PLAN OF CARE
Goal Outcome Evaluation:       Problem: Adult Inpatient Plan of Care  Goal: Plan of Care Review  Description: The Plan of Care Review/Shift note should be completed every shift.  The Outcome Evaluation is a brief statement about your assessment that the patient is improving, declining, or no change.  This information will be displayed automatically on your shift  note.  Outcome: Progressing  Flowsheets (Taken 3/6/2025 8337)  Plan of Care Reviewed With: patient  Overall Patient Progress: no change   VSS on 2L nc. Pt is confused and oriented to self only. Assist of 1-2 pivot to commode and chair.  Pt tolerating regular diet. IV saline locked. Incontinent of bowel and bladder. Denies pain. Alarms on.

## 2025-03-07 NOTE — PLAN OF CARE
Physical Therapy Discharge Summary    Reason for therapy discharge:    Discharged to assisted living facility    Progress towards therapy goal(s). See [facility] for goal details.  Goals not met.  Barriers to achieving goals:   discharge from facility.    Therapy recommendation(s):    Continued therapy is recommended.  Rationale/Recommendations:  continued PT with home PT to improve functional mobility.

## 2025-03-07 NOTE — DISCHARGE SUMMARY
"[facility]  Discharge Summary - Medicine & Pediatrics       Date of Admission:  3/5/2025  Date of Discharge:  3/7/2025  Discharging Provider: Dr. Rios  Discharge Service: Hospitalist Service    Discharge Diagnoses   Acute Exacerbation of Chronic Bronchiectasis      Clinically Significant Risk Factors     # Overweight: Estimated body mass index is 27.62 kg/m  as calculated from the following:    Height as of this encounter: 1.803 m (5' 11\").    Weight as of this encounter: 89.8 kg (198 lb).       Follow-ups Needed After Discharge   Follow-up Appointments       Follow Up and recommended labs and tests      Follow up with Nursing home physician for post-hospital discharge and to evaluate lung exam.              Discharge Disposition   Discharged to long-term care facility  Condition at discharge: Stable    Hospital Course   Stephanie Acuna was admitted on 3/5/2025 for acute exacerbation of chronic bronchiectasis.  The following problems were addressed during her hospitalization:    Acute on Chronic Respiratory Failure  Chronic bronchiectasis exacerbation  Increased work of breathing  Patient presented with increased work of breathing (belly breathing) and audible rhonchi and wheezing. Recent hospitalization 1/14-1/20 with bronchiectasis flareup secondary to acute asthma exacerbation. Viral panel negative. Xray showing atelectasis. Labs overall wnl. D-dimer elevated, CTPE study comprmised by respiratory motion and shallow inspiration but no evidence of acute PE. Pulmonology was consulted. Received ~2.5 days of IV steroids along with 3 doses of Levaquin and scheduled DuoNebs, with considerable improvement. At discharge, the patient continued to saturate well on her home 2L of oxygen and lungs were clear to auscultation bilaterally. Patient was discharged on home O2 2L, continuation of home PT/OT, home budesonide, and 4 additional days of PO Levaquin 500mg for total 7 day course for empiric " CAP treatment. A referral to pulmonology was placed for patient to establish care as she does not currently follow with anyone, last seen in 2023 with doctor who is now retired.     Consultations This Hospital Stay   PULMONARY IP CONSULT  PHYSICAL THERAPY ADULT IP CONSULT  OCCUPATIONAL THERAPY ADULT IP CONSULT  RESPIRATORY CARE IP CONSULT  CARE MANAGEMENT / SOCIAL WORK IP CONSULT  PHYSICAL THERAPY ADULT IP CONSULT  OCCUPATIONAL THERAPY ADULT IP CONSULT    Code Status   No CPR- Do NOT Intubate       The patient was discussed with Dr. Blanca Pickens MD  [address] [address]  Phone: [phone]  Fax: [phone]  ______________________________________________________________________    Physical Exam   Vital Signs: Temp: 97.8  F (36.6  C) Temp src: Oral BP: (!) 145/77 Pulse: 75   Resp: 16 SpO2: 94 % O2 Device: Nasal cannula Oxygen Delivery: 2 LPM  Weight: 198 lbs 0 oz    GENERAL: Awake, alert, answering yes/no, as well as one word answers  HEENT: No scleral icterus or conjunctival injection. Nose with NC, normal lids and lashes  SKIN: Warm and dry. No bruises, rashes, or skin lesions.  LUNGS: No increased work of breathing, lungs clear to auscultation bilaterally with no wheezing or crackles and good air entry.   CARDIAC: Normal rate and rhythm. No peripheral edema  ABDOMEN: Non-distended. Soft and non-tender throughout with no masses or organomegaly.  EXTREMITIES: No gross deformity or peripheral edema. Appear well-perfused      Primary Care Physician   Jing Tavarez    Discharge Orders      Adult Pulmonary Medicine  Referral      General info for SNF    Length of Stay Estimate: Long Term Care  Condition at Discharge: Stable  Level of care:skilled   Rehabilitation Potential: Good  Admission H&P remains valid and up-to-date: Yes  Recent Chemotherapy: N/A  Use Nursing Home Standing Orders: Yes     Mantoux instructions     Give two-step Mantoux (PPD) Per Facility Policy Yes     Follow Up and recommended labs and tests    Follow up with Nursing home physician for post-hospital discharge and to evaluate lung exam.     Reason for your hospital stay    Acute Asthma Exacerbation vs Acute on Chronic Bronchiectasis Exacerbation     Activity - Up with nursing assistance     Physical Therapy Adult Consult    Evaluate and treat as clinically indicated.    Reason:  Continue previous treatment, deconditioning     Occupational Therapy Adult Consult    Evaluate and treat as clinically indicated.    Reason:  evaluate and treat as previous, deconditioning     Oxygen (SNF/TCU) Discharge    Continue home oxygen as previous     Fall precautions     Diet    Follow this diet upon discharge:       Combination Diet Regular Diet Adult       Significant Results and Procedures   Most Recent 3 CBC's:  Recent Labs   Lab Test 03/06/25  0729 03/05/25  1127 01/20/25  0637 01/17/25  0056 01/15/25  0641   WBC 9.1 8.2  --   --  6.8   HGB 10.7* 12.2  --   --  11.1*   MCV 94 95  --   --  94    282 298   < > 291    < > = values in this interval not displayed.     Most Recent 3 BMP's:  Recent Labs   Lab Test 03/05/25  1127 01/20/25  0637 01/16/25  0808 01/15/25  0641     --  139 137   POTASSIUM 4.2  --  3.9 4.2   CHLORIDE 99  --  102 101   CO2 25  --  23 22   BUN 16.0  --  27.5* 27.9*   CR 0.94 0.99* 0.90 0.97*   ANIONGAP 14  --  14 14   LIZETH 9.6  --  9.6 9.3   *  --  105* 165*   ,   Results for orders placed or performed during the hospital encounter of 03/05/25   XR Chest Port 1 View    Narrative    EXAM: XR CHEST PORT 1 VIEW  LOCATION: [facility]  DATE: 3/5/2025    INDICATION: Shortness of breath.  COMPARISON: Chest radiograph 6/20/2024. CT chest 1/4/2025.      Impression    IMPRESSION:     Elevated right hemidiaphragm. Right greater than left bibasilar atelectasis and/or scarring. Lungs are otherwise clear. No pleural  effusions or pneumothorax. Normal pulmonary vascularity.    Nonenlarged cardiac silhouette.    Old healed fractures of the left posterolateral 5th-7th ribs. Mild scoliosis.   CT Chest Pulmonary Embolism w Contrast    Narrative    EXAM: CT CHEST PULMONARY EMBOLISM W CONTRAST  LOCATION: [facility]  DATE: 3/6/2025    INDICATION: Increased work of breathing and elevated d-dimer. Evaluate for acute pulmonary embolus.  COMPARISON: Chest CTA 1/14/2025  TECHNIQUE: CT chest pulmonary angiogram during arterial phase injection of IV contrast. Multiplanar reformats and MIP reconstructions were performed. Dose reduction techniques were used.   CONTRAST: Isovue 370 90ml    FINDINGS:  ANGIOGRAM CHEST: Respiratory motion. Pulmonary arteries are normal caliber and negative for pulmonary emboli. Some peripheral lower lobe subsegmental branches are obscured by motion artifact. Thoracic aorta is negative for dissection. No CT evidence of   right heart strain.    LUNGS AND PLEURA: Very shallow inspiration. Chronically elevated right hemidiaphragm with right basilar comparison subsegmental atelectasis. Scattered bilateral subpleural scar redemonstrated. No pleural effusion. Mild bronchial wall thickening. Luminal   narrowing of central airways likely secondary to shallow inspiration.    MEDIASTINUM/AXILLAE: No adenopathy nor pericardial effusion. The heart is normal in size.    CORONARY ARTERY CALCIFICATION: Moderate.    UPPER ABDOMEN: Negative.    MUSCULOSKELETAL: No suspicious osseous lesions.      Impression    IMPRESSION:  1.  Study compromised by respiratory motion and shallow inspiration.  2.  No evidence of acute pulmonary embolus.  3.  Mild bronchial wall thickening redemonstrated. No focal consolidation or pleural effusion.       Discharge Medications   Current Discharge Medication List        START taking these medications    Details   levofloxacin (LEVAQUIN) 500 MG tablet Take 1 tablet (500 mg) by  mouth every 24 hours.  Qty: 4 tablet, Refills: 0    Associated Diagnoses: Bronchiectasis with acute exacerbation (H)           CONTINUE these medications which have CHANGED    Details   ipratropium - albuterol 0.5 mg/2.5 mg/3 mL (DUONEB) 0.5-2.5 (3) MG/3ML neb solution Take 1 vial (3 mLs) by nebulization every 4 hours as needed for wheezing (congestion).  Qty: 90 mL, Refills: 0    Associated Diagnoses: Bronchiectasis with acute exacerbation (H)      predniSONE (DELTASONE) 50 MG tablet Take 1 tablet (50 mg) by mouth daily as needed (Take for 5 days if new asthma exacerbation not adequately improved by inhaled steroids).  Qty: 5 tablet, Refills: 0    Associated Diagnoses: Bronchiectasis with acute exacerbation (H)           CONTINUE these medications which have NOT CHANGED    Details   acetaminophen (TYLENOL) 500 MG tablet Take 1,000 mg by mouth 3 times daily as needed for pain.      albuterol (PROAIR HFA/PROVENTIL HFA/VENTOLIN HFA) 108 (90 Base) MCG/ACT inhaler Inhale 1 puff into the lungs every 4 hours as needed for shortness of breath or wheezing.      albuterol (PROVENTIL) (2.5 MG/3ML) 0.083% neb solution Take 2.5 mg by nebulization every 4 hours as needed for wheezing (congestion).      budesonide (PULMICORT) 0.25 MG/2ML neb solution Take 0.25 mg by nebulization 2 times daily.      buPROPion (WELLBUTRIN XL) 150 MG 24 hr tablet Take 150 mg by mouth every morning.      cetirizine (ZYRTEC) 10 MG tablet Take 1 tablet by mouth every morning.      donepezil (ARICEPT) 10 MG tablet Take 10 mg by mouth at bedtime.      guaiFENesin (MUCINEX) 600 MG 12 hr tablet Take 600 mg by mouth 2 times daily as needed for congestion.      hydrochlorothiazide (HYDRODIURIL) 25 MG tablet Take 25 mg by mouth every morning.      losartan (COZAAR) 50 MG tablet Take 50 mg by mouth every morning.      Multiple Vitamins-Minerals (CENTRUM SILVER ADULT 50+) TABS Take 1 tablet by mouth every morning.      Multiple Vitamins-Minerals (PRESERVISION  AREDS) CAPS Take 1 capsule by mouth every morning.      !! pramoxine (PRAX) 1 % LOTN lotion Apply topically daily. Apply to back for itching.      !! pramoxine (PRAX) 1 % LOTN lotion Apply topically 2 times daily as needed for itching. Apply to back       !! - Potential duplicate medications found. Please discuss with provider.        Allergies   Allergies   Allergen Reactions    Amlodipine Swelling    Escitalopram Other (See Comments)     Other Reaction(s): Insomnia/personailty changes    And personality changes    Loratadine      Other Reaction(s): Very dry    Sulfa Antibiotics Unknown

## 2025-03-07 NOTE — PROGRESS NOTES
PULMONARY PROGRESS NOTE    Date / Time of Admission:  3/5/2025 10:44 AM  Assessment:   74yoF with history of bronchiectasis, asthma on chronic O2 at 2L here with bronchiectasis exacerbation.     Active Problems:    Bronchiectasis with acute exacerbation (H)      Plan:   -Bronchial hygiene is difficulty due to her mental status.   -duonebs   -Pulmicort nebs once discharged  -Levaquin appears appropriate. Unfortunately no sputum to guide treatment.         Subjective:   HPI:  Stephanie Acuna is a 74 year old female with bronchiectasis, asthma on home O2 with pretty significant dementia. She presented 3/5/25 with worsening shortness of breath and wheezing. Prednisone was not started at her [facility] and she ended up at [facility]. She was given steroids and nebs in the ED and her wheezing has improved significantly.     She has remained on her 2L as is her baseline.      Allergies:   Allergies   Allergen Reactions    Amlodipine Swelling    Escitalopram Other (See Comments)     Other Reaction(s): Insomnia/personailty changes    And personality changes    Loratadine      Other Reaction(s): Very dry    Sulfa Antibiotics Unknown        MEDS:  Scheduled Meds:  Current Facility-Administered Medications   Medication Dose Route Frequency Provider Last Rate Last Admin    [Held by provider] budesonide (PULMICORT) neb solution 0.25 mg  0.25 mg Nebulization BID Arlen Pickens MD   0.25 mg at 03/06/25 0725    buPROPion (WELLBUTRIN XL) 24 hr tablet 150 mg  150 mg Oral QAM Arlen Pickens MD   150 mg at 03/07/25 0615    cetirizine (zyrTEC) tablet 10 mg  10 mg Oral QAArlen Fuentes MD   10 mg at 03/07/25 0615    [Held by provider] donepezil (ARICEPT) tablet 10 mg  10 mg Oral At Bedtime Arlen Pickens MD        emollient (VANICREAM) cream   Topical Daily Arlen Pickens MD        enoxaparin ANTICOAGULANT (LOVENOX) injection 40 mg  40 mg Subcutaneous Q24H Arlen Pickens MD   40 mg at 03/06/25 1957    hydrochlorothiazide  (HYDRODIURIL) tablet 25 mg  25 mg Oral Arlen Martinez MD   25 mg at 03/07/25 0616    ipratropium - albuterol 0.5 mg/2.5 mg/3 mL (DUONEB) neb solution 3 mL  3 mL Nebulization Q4H Arlen Pickens MD   3 mL at 03/07/25 0751    levofloxacin (LEVAQUIN) tablet 500 mg  500 mg Oral Q24H Evelyn Sofia DO   500 mg at 03/06/25 1037    losartan (COZAAR) tablet 50 mg  50 mg Oral Arlen Martinez MD   50 mg at 03/07/25 0617    methylPREDNISolone sodium succinate (SOLU-MEDROL) injection 40 mg  40 mg Intravenous Q12H Evelyn Sofia DO   40 mg at 03/06/25 2141    sodium chloride (PF) 0.9% PF flush 3 mL  3 mL Intracatheter Q8H Arlen Pickens MD   3 mL at 03/06/25 2144     Continuous Infusions:  Current Facility-Administered Medications   Medication Dose Route Frequency Provider Last Rate Last Admin     PRN Meds:.  Current Facility-Administered Medications   Medication Dose Route Frequency Provider Last Rate Last Admin    acetaminophen (TYLENOL) tablet 650 mg  650 mg Oral Q4H PRN Arlen Pickens MD        Or    acetaminophen (TYLENOL) Suppository 650 mg  650 mg Rectal Q4H PRN Arlen Pickens MD        calcium carbonate (TUMS) chewable tablet 1,000 mg  1,000 mg Oral 4x Daily PRN Arlen Pickens MD        emollient (VANICREAM) cream   Topical BID PRN Arlen Pickens MD        guaiFENesin (MUCINEX) 12 hr tablet 600 mg  600 mg Oral BID PRN Arlen Pickens MD   600 mg at 03/05/25 1951    lidocaine (LMX4) cream   Topical Q1H PRN Arlen Pickens MD        lidocaine 1 % 0.1-1 mL  0.1-1 mL Other Q1H PRN Arlen Pickens MD        ondansetron (ZOFRAN ODT) ODT tab 4 mg  4 mg Oral Q6H PRN Arlen Pickens MD        Or    ondansetron (ZOFRAN) injection 4 mg  4 mg Intravenous Q6H PRN Arlen Pickens MD        polyethylene glycol (MIRALAX) Packet 17 g  17 g Oral BID PRN Arlen Pickens MD        senna-docusate (SENOKOT-S/PERICOLACE) 8.6-50 MG per tablet 1 tablet  1 tablet Oral BID PRN Arlen Pickens MD        Or     "senna-docusate (SENOKOT-S/PERICOLACE) 8.6-50 MG per tablet 2 tablet  2 tablet Oral BID PRN Arlen Pickens MD        sodium chloride (PF) 0.9% PF flush 3 mL  3 mL Intracatheter q1 min prn Arlen Pickens MD           Objective:   VITALS:  /69 (BP Location: Left arm)   Pulse 75   Temp 97.8  F (36.6  C) (Oral)   Resp 16   Ht 1.803 m (5' 11\")   Wt 89.8 kg (198 lb)   SpO2 95%   BMI 27.62 kg/m    EXAM:    GENERAL APPEARANCE ADULT: easily arousable but falls back asleep, no distress  HENT: Oral mucosa and posterior oropharynx normal, moist mucous membranes  NECK: No adenopathy,masses or thyromegaly  RESP: lungs clear to auscultation bilaterally  CV: regular rate and rhythm, no murmur, rub or gallop  ABDOMEN: normal bowel sounds, soft, nontender, no hepatosplenomegaly or other masses  LYMPHATICS: No cervical, or supraclavicular adenopathy  SKIN: no suspicious lesions or rashes  NEURO: minimal response but does say yes or no. Moving all 4s.         I&O:    Date 03/07/25 0700 - 03/08/25 0659   Shift 4024-0456 0504-5391 4530-1301 24 Hour Total   INTAKE   P.O. 360   360   Shift Total(mL/kg) 360(4.01)   360(4.01)   OUTPUT   Shift Total(mL/kg)       Weight (kg) 89.81 89.81 89.81 89.81       Data Review:    Imaging: personally reviewed  Chest CT  EXAM: CT CHEST PULMONARY EMBOLISM W CONTRAST  LOCATION: [facility]  DATE: 3/6/2025     INDICATION: Increased work of breathing and elevated d-dimer. Evaluate for acute pulmonary embolus.  COMPARISON: Chest CTA 1/14/2025  TECHNIQUE: CT chest pulmonary angiogram during arterial phase injection of IV contrast. Multiplanar reformats and MIP reconstructions were performed. Dose reduction techniques were used.   CONTRAST: Isovue 370 90ml     FINDINGS:  ANGIOGRAM CHEST: Respiratory motion. Pulmonary arteries are normal caliber and negative for pulmonary emboli. Some peripheral lower lobe subsegmental branches are obscured by motion artifact. Thoracic " aorta is negative for dissection. No CT evidence of   right heart strain.     LUNGS AND PLEURA: Very shallow inspiration. Chronically elevated right hemidiaphragm with right basilar comparison subsegmental atelectasis. Scattered bilateral subpleural scar redemonstrated. No pleural effusion. Mild bronchial wall thickening. Luminal   narrowing of central airways likely secondary to shallow inspiration.     MEDIASTINUM/AXILLAE: No adenopathy nor pericardial effusion. The heart is normal in size.     CORONARY ARTERY CALCIFICATION: Moderate.     UPPER ABDOMEN: Negative.     MUSCULOSKELETAL: No suspicious osseous lesions.                                                                      IMPRESSION:  1.  Study compromised by respiratory motion and shallow inspiration.  2.  No evidence of acute pulmonary embolus.  3.  Mild bronchial wall thickening redemonstrated. No focal consolidation or pleural effusion.  Results for orders placed or performed during the hospital encounter of 01/14/25   Basic metabolic panel    Collection Time: 01/16/25  8:08 AM   Result Value Ref Range    Sodium 139 135 - 145 mmol/L    Potassium 3.9 3.4 - 5.3 mmol/L    Chloride 102 98 - 107 mmol/L    Carbon Dioxide (CO2) 23 22 - 29 mmol/L    Anion Gap 14 7 - 15 mmol/L    Urea Nitrogen 27.5 (H) 8.0 - 23.0 mg/dL    Creatinine 0.90 0.51 - 0.95 mg/dL    GFR Estimate 66 >60 mL/min/1.73m2    Calcium 9.6 8.8 - 10.4 mg/dL    Glucose 105 (H) 70 - 99 mg/dL     Lab Results   Component Value Date    WBC 9.1 03/06/2025    HGB 10.7 (L) 03/06/2025    HCT 32.0 (L) 03/06/2025    MCV 94 03/06/2025     03/06/2025     Last Arterial Blood Gas:  No lab results found in last 7 days.

## 2025-03-07 NOTE — PROGRESS NOTES
Pt was given her scheduled neb throughout the night. Pt stayed on 2L NC with SAT in mid 90's. BS coarse. RT will continue to monitor pt.

## 2025-03-08 ENCOUNTER — PATIENT OUTREACH (OUTPATIENT)
Dept: CARE COORDINATION | Facility: CLINIC | Age: 75
End: 2025-03-08
Payer: COMMERCIAL

## 2025-03-08 NOTE — PROGRESS NOTES
[facility]    Background: Transitional Care Management program identified per system criteria and reviewed by [facility] team for possible outreach.    Assessment: Upon chart review, [facility] Team member will not proceed with patient outreach related to this episode of Transitional Care Management program due to reason below:    Non-[facility] TCU: [facility] team member noted patient discharged to TCU/ARU/LTACH. Patient is not established with a [facility] currently supported by Primary Care-Care Coordination therefore handoff to Primary Care-Care Coordination is not appropriate at this time.    Plan: Transitional Care Management episode addressed appropriately per reason noted above.      BRAXTON Nevarez  [facility] [place], [facility]    *Connected Care Resource Team does NOT follow patient ongoing. Referrals are identified based on internal discharge reports and the outreach is to ensure patient has an understanding of their discharge instructions.

## 2025-04-05 ENCOUNTER — HEALTH MAINTENANCE LETTER (OUTPATIENT)
Age: 75
End: 2025-04-05

## 2025-05-07 ENCOUNTER — LAB REQUISITION (OUTPATIENT)
Dept: LAB | Facility: CLINIC | Age: 75
End: 2025-05-07
Payer: COMMERCIAL

## 2025-05-07 DIAGNOSIS — L29.9 PRURITUS, UNSPECIFIED: ICD-10-CM

## 2025-05-14 PROCEDURE — 36415 COLL VENOUS BLD VENIPUNCTURE: CPT | Mod: ORL | Performed by: INTERNAL MEDICINE

## 2025-05-14 PROCEDURE — P9604 ONE-WAY ALLOW PRORATED TRIP: HCPCS | Mod: ORL | Performed by: INTERNAL MEDICINE

## 2025-05-14 PROCEDURE — 82785 ASSAY OF IGE: CPT | Mod: ORL | Performed by: INTERNAL MEDICINE

## 2025-05-15 LAB
ALMOND IGE QN: <0.1 KU(A)/L
CASHEW NUT IGE QN: 0.19 KU(A)/L
CODFISH IGE QN: <0.1 KU(A)/L
COW MILK IGE QN: 0.16 KU(A)/L
EGG WHITE IGE QN: 0.32 KU(A)/L
HAZELNUT IGE QN: <0.1 KU(A)/L
IGE SERPL-ACNC: 793 KU/L (ref 0–114)
PEANUT IGE QN: <0.1 KU(A)/L
SALMON IGE QN: <0.1 KU(A)/L
SCALLOP IGE QN: <0.1 KU(A)/L
SESAME SEED IGE QN: <0.1 KU(A)/L
SHRIMP IGE QN: <0.1 KU(A)/L
SOYBEAN IGE QN: <0.1 KU(A)/L
TUNA IGE QN: <0.1 KU(A)/L
WALNUT IGE QN: <0.1 KU(A)/L
WHEAT IGE QN: 0.26 KU(A)/L

## 2025-07-02 ENCOUNTER — LAB REQUISITION (OUTPATIENT)
Dept: LAB | Facility: CLINIC | Age: 75
End: 2025-07-02
Payer: COMMERCIAL

## 2025-07-02 DIAGNOSIS — R82.89 OTHER ABNORMAL FINDINGS ON CYTOLOGICAL AND HISTOLOGICAL EXAMINATION OF URINE: ICD-10-CM

## 2025-07-02 LAB
ALBUMIN UR-MCNC: NEGATIVE MG/DL
APPEARANCE UR: ABNORMAL
BACTERIA #/AREA URNS HPF: ABNORMAL /HPF
BILIRUB UR QL STRIP: NEGATIVE
COLOR UR AUTO: YELLOW
GLUCOSE UR STRIP-MCNC: NEGATIVE MG/DL
HGB UR QL STRIP: NEGATIVE
KETONES UR STRIP-MCNC: NEGATIVE MG/DL
LEUKOCYTE ESTERASE UR QL STRIP: ABNORMAL
MUCOUS THREADS #/AREA URNS LPF: PRESENT /LPF
NITRATE UR QL: POSITIVE
PH UR STRIP: 6 [PH] (ref 5–7)
RBC URINE: 1 /HPF
SP GR UR STRIP: 1.02 (ref 1–1.03)
SQUAMOUS EPITHELIAL: 2 /HPF
UROBILINOGEN UR STRIP-MCNC: NORMAL MG/DL
WBC URINE: 11 /HPF

## 2025-07-02 PROCEDURE — 81001 URINALYSIS AUTO W/SCOPE: CPT | Mod: ORL | Performed by: INTERNAL MEDICINE

## 2025-07-02 PROCEDURE — 87086 URINE CULTURE/COLONY COUNT: CPT | Mod: ORL | Performed by: INTERNAL MEDICINE

## 2025-07-03 LAB — BACTERIA UR CULT: NORMAL

## 2025-07-04 ENCOUNTER — LAB REQUISITION (OUTPATIENT)
Dept: LAB | Facility: CLINIC | Age: 75
End: 2025-07-04
Payer: COMMERCIAL

## 2025-07-04 DIAGNOSIS — R82.89 OTHER ABNORMAL FINDINGS ON CYTOLOGICAL AND HISTOLOGICAL EXAMINATION OF URINE: ICD-10-CM

## 2025-07-04 LAB
ALBUMIN UR-MCNC: NEGATIVE MG/DL
APPEARANCE UR: ABNORMAL
BACTERIA #/AREA URNS HPF: ABNORMAL /HPF
BILIRUB UR QL STRIP: NEGATIVE
COLOR UR AUTO: YELLOW
GLUCOSE UR STRIP-MCNC: NEGATIVE MG/DL
HGB UR QL STRIP: NEGATIVE
KETONES UR STRIP-MCNC: NEGATIVE MG/DL
LEUKOCYTE ESTERASE UR QL STRIP: ABNORMAL
MUCOUS THREADS #/AREA URNS LPF: PRESENT /LPF
NITRATE UR QL: POSITIVE
PH UR STRIP: 6.5 [PH] (ref 5–7)
RBC URINE: 0 /HPF
SP GR UR STRIP: 1.02 (ref 1–1.03)
SQUAMOUS EPITHELIAL: 3 /HPF
UROBILINOGEN UR STRIP-MCNC: NORMAL MG/DL
WBC URINE: 6 /HPF

## 2025-07-04 PROCEDURE — 81001 URINALYSIS AUTO W/SCOPE: CPT | Mod: ORL | Performed by: INTERNAL MEDICINE

## 2025-07-04 PROCEDURE — 87086 URINE CULTURE/COLONY COUNT: CPT | Mod: ORL | Performed by: INTERNAL MEDICINE

## 2025-07-05 LAB — BACTERIA UR CULT: NORMAL

## 2025-07-22 ENCOUNTER — LAB REQUISITION (OUTPATIENT)
Dept: LAB | Facility: CLINIC | Age: 75
End: 2025-07-22
Payer: COMMERCIAL

## 2025-07-22 DIAGNOSIS — F02.80 DEMENTIA IN OTHER DISEASES CLASSIFIED ELSEWHERE, UNSPECIFIED SEVERITY, WITHOUT BEHAVIORAL DISTURBANCE, PSYCHOTIC DISTURBANCE, MOOD DISTURBANCE, AND ANXIETY (H): ICD-10-CM

## 2025-07-23 LAB
ALBUMIN SERPL BCG-MCNC: 4.1 G/DL (ref 3.5–5.2)
ALP SERPL-CCNC: 75 U/L (ref 40–150)
ALT SERPL W P-5'-P-CCNC: 19 U/L (ref 0–50)
ANION GAP SERPL CALCULATED.3IONS-SCNC: 16 MMOL/L (ref 7–15)
AST SERPL W P-5'-P-CCNC: 23 U/L (ref 0–45)
BILIRUB SERPL-MCNC: 0.4 MG/DL
BUN SERPL-MCNC: 18.2 MG/DL (ref 8–23)
CALCIUM SERPL-MCNC: 9.7 MG/DL (ref 8.8–10.4)
CHLORIDE SERPL-SCNC: 99 MMOL/L (ref 98–107)
CREAT SERPL-MCNC: 0.82 MG/DL (ref 0.51–0.95)
EGFRCR SERPLBLD CKD-EPI 2021: 73 ML/MIN/1.73M2
ERYTHROCYTE [DISTWIDTH] IN BLOOD BY AUTOMATED COUNT: 13.1 % (ref 10–15)
GLUCOSE SERPL-MCNC: 192 MG/DL (ref 70–99)
HCO3 SERPL-SCNC: 24 MMOL/L (ref 22–29)
HCT VFR BLD AUTO: 37.1 % (ref 35–47)
HGB BLD-MCNC: 11.9 G/DL (ref 11.7–15.7)
MCH RBC QN AUTO: 31.1 PG (ref 26.5–33)
MCHC RBC AUTO-ENTMCNC: 32.1 G/DL (ref 31.5–36.5)
MCV RBC AUTO: 97 FL (ref 78–100)
PLATELET # BLD AUTO: 270 10E3/UL (ref 150–450)
POTASSIUM SERPL-SCNC: 3.3 MMOL/L (ref 3.4–5.3)
PROT SERPL-MCNC: 6.5 G/DL (ref 6.4–8.3)
RBC # BLD AUTO: 3.83 10E6/UL (ref 3.8–5.2)
SODIUM SERPL-SCNC: 139 MMOL/L (ref 135–145)
TSH SERPL DL<=0.005 MIU/L-ACNC: 3.14 UIU/ML (ref 0.3–4.2)
VIT B12 SERPL-MCNC: 774 PG/ML (ref 232–1245)
WBC # BLD AUTO: 10.2 10E3/UL (ref 4–11)

## 2025-07-23 PROCEDURE — 85027 COMPLETE CBC AUTOMATED: CPT | Mod: ORL | Performed by: INTERNAL MEDICINE

## 2025-07-23 PROCEDURE — 36415 COLL VENOUS BLD VENIPUNCTURE: CPT | Mod: ORL | Performed by: INTERNAL MEDICINE

## 2025-07-23 PROCEDURE — 84443 ASSAY THYROID STIM HORMONE: CPT | Mod: ORL | Performed by: INTERNAL MEDICINE

## 2025-07-23 PROCEDURE — 82607 VITAMIN B-12: CPT | Mod: ORL | Performed by: INTERNAL MEDICINE

## 2025-07-23 PROCEDURE — P9603 ONE-WAY ALLOW PRORATED MILES: HCPCS | Mod: ORL | Performed by: INTERNAL MEDICINE

## 2025-07-23 PROCEDURE — 80053 COMPREHEN METABOLIC PANEL: CPT | Mod: ORL | Performed by: INTERNAL MEDICINE

## 2025-08-26 ENCOUNTER — LAB REQUISITION (OUTPATIENT)
Dept: LAB | Facility: CLINIC | Age: 75
End: 2025-08-26
Payer: COMMERCIAL

## 2025-08-26 DIAGNOSIS — N18.31 CHRONIC KIDNEY DISEASE, STAGE 3A (H): ICD-10-CM

## 2025-08-27 LAB
ANION GAP SERPL CALCULATED.3IONS-SCNC: 16 MMOL/L (ref 7–15)
BUN SERPL-MCNC: 16.1 MG/DL (ref 8–23)
CALCIUM SERPL-MCNC: 9.7 MG/DL (ref 8.8–10.4)
CHLORIDE SERPL-SCNC: 98 MMOL/L (ref 98–107)
CREAT SERPL-MCNC: 0.96 MG/DL (ref 0.51–0.95)
EGFRCR SERPLBLD CKD-EPI 2021: 61 ML/MIN/1.73M2
GLUCOSE SERPL-MCNC: 178 MG/DL (ref 70–99)
HCO3 SERPL-SCNC: 23 MMOL/L (ref 22–29)
POTASSIUM SERPL-SCNC: 3.8 MMOL/L (ref 3.4–5.3)
SODIUM SERPL-SCNC: 137 MMOL/L (ref 135–145)